# Patient Record
Sex: FEMALE | Race: OTHER | HISPANIC OR LATINO
[De-identification: names, ages, dates, MRNs, and addresses within clinical notes are randomized per-mention and may not be internally consistent; named-entity substitution may affect disease eponyms.]

---

## 2021-03-24 ENCOUNTER — RESULT REVIEW (OUTPATIENT)
Age: 46
End: 2021-03-24

## 2021-03-24 ENCOUNTER — APPOINTMENT (OUTPATIENT)
Dept: HEMATOLOGY ONCOLOGY | Facility: CLINIC | Age: 46
End: 2021-03-24
Payer: COMMERCIAL

## 2021-03-24 ENCOUNTER — LABORATORY RESULT (OUTPATIENT)
Age: 46
End: 2021-03-24

## 2021-03-24 ENCOUNTER — OUTPATIENT (OUTPATIENT)
Dept: OUTPATIENT SERVICES | Facility: HOSPITAL | Age: 46
LOS: 1 days | Discharge: HOME | End: 2021-03-24

## 2021-03-24 VITALS
DIASTOLIC BLOOD PRESSURE: 85 MMHG | HEART RATE: 84 BPM | WEIGHT: 171.5 LBS | HEIGHT: 63 IN | SYSTOLIC BLOOD PRESSURE: 136 MMHG | OXYGEN SATURATION: 100 % | BODY MASS INDEX: 30.39 KG/M2

## 2021-03-24 DIAGNOSIS — Z82.49 FAMILY HISTORY OF ISCHEMIC HEART DISEASE AND OTHER DISEASES OF THE CIRCULATORY SYSTEM: ICD-10-CM

## 2021-03-24 DIAGNOSIS — Z78.9 OTHER SPECIFIED HEALTH STATUS: ICD-10-CM

## 2021-03-24 DIAGNOSIS — C50.919 MALIGNANT NEOPLASM OF UNSPECIFIED SITE OF UNSPECIFIED FEMALE BREAST: ICD-10-CM

## 2021-03-24 LAB
BASOPHILS # BLD AUTO: 0.1 K/UL — SIGNIFICANT CHANGE UP (ref 0–0.2)
BASOPHILS NFR BLD AUTO: 0.8 % — SIGNIFICANT CHANGE UP (ref 0–2)
EOSINOPHIL # BLD AUTO: 0.1 K/UL — SIGNIFICANT CHANGE UP (ref 0–0.5)
EOSINOPHIL NFR BLD AUTO: 1.3 % — SIGNIFICANT CHANGE UP (ref 0–6)
HCT VFR BLD CALC: 44.8 % — SIGNIFICANT CHANGE UP (ref 34.5–45)
HGB BLD-MCNC: 13.8 G/DL — SIGNIFICANT CHANGE UP (ref 11.5–15.5)
LYMPHOCYTES # BLD AUTO: 1.8 K/UL — SIGNIFICANT CHANGE UP (ref 1–3.3)
LYMPHOCYTES # BLD AUTO: 21.2 % — SIGNIFICANT CHANGE UP (ref 13–44)
MCHC RBC-ENTMCNC: 25.7 PG — LOW (ref 27–34)
MCHC RBC-ENTMCNC: 30.7 G/DL — LOW (ref 32–36)
MCV RBC AUTO: 83.6 FL — SIGNIFICANT CHANGE UP (ref 80–100)
MONOCYTES # BLD AUTO: 0.5 K/UL — SIGNIFICANT CHANGE UP (ref 0–0.9)
MONOCYTES NFR BLD AUTO: 5.3 % — SIGNIFICANT CHANGE UP (ref 2–14)
NEUTROPHILS # BLD AUTO: 6.1 K/UL — SIGNIFICANT CHANGE UP (ref 1.8–7.4)
NEUTROPHILS NFR BLD AUTO: 71.3 % — SIGNIFICANT CHANGE UP (ref 43–77)
PLATELET # BLD AUTO: 329 K/UL — SIGNIFICANT CHANGE UP (ref 150–400)
RBC # BLD: 5.36 M/UL — HIGH (ref 3.8–5.2)
RBC # FLD: 13.9 % — SIGNIFICANT CHANGE UP (ref 10.3–14.5)
WBC # BLD: 8.5 K/UL — SIGNIFICANT CHANGE UP (ref 3.8–10.5)
WBC # FLD AUTO: 8.5 K/UL — SIGNIFICANT CHANGE UP (ref 3.8–10.5)

## 2021-03-24 PROCEDURE — 99205 OFFICE O/P NEW HI 60 MIN: CPT

## 2021-03-24 RX ORDER — HYDROCHLOROTHIAZIDE 12.5 MG/1
12.5 CAPSULE ORAL
Refills: 0 | Status: ACTIVE | COMMUNITY

## 2021-03-24 NOTE — ASSESSMENT
[FreeTextEntry1] : 44 yo female with  past medical history of HTN with a diagnosis of T1b N0 IDC Gr 3, with DCIS and LCIS s/p mastectomy in Feb 2021 at the age of 45\par \par Screening Mammogram done in nov 2020, with benign findings  Patient subsequently, had pain so had a biopsy on 11/25/21  PATHOLOGY with Left Breast 1.00 5cm FN Moderately differentiated IDC measuring 0.8cm, with DCIS intermediate grade with solid and cribriform pattern, focal necrosis and calcifications and extending into lobules\par \par Repeat BIopsy on 1/8/21 on 2 separate areas of left breast with DCIS intermediate to high grade cribriform , % %  In addition  Left axilla 1.2 cm Negative for malignancy, C/w a benign process\par \par Per patient genetic w/u with no Mutation with BRCA / PALB2/ PTEN\par \par Today we discussed the available radiographic and pathologic data in detail. We also discussed the natural history of the disease, as well as management options. Discussed potential of chemotherapy and endocrine therapy in management of early stage breast cancer. Endocrine therapy may include Tamoxifen or OS+AI. Discussed role of Oncotype Dx in guiding decisions about chemotherapy.\par \par - Breast surgeon Keon Coronado \par - WIll request for pathology and send Oncotype DX\par - Discussed potential of chemotherapy and or Endocrine therapy given young age\par - She is premenopausal and would consider Franklin + OFS\par - Right breast: WIll have to continue with routine imaging of right breast \par - F/u with me in 3 weeks \par

## 2021-03-24 NOTE — HISTORY OF PRESENT ILLNESS
[de-identified] : 46 yo female with  past medical history of HTN on HCTZ\par Patient was having routine screening mammograms from the age of 42\par \par On 11/3/2020: screening Mammogram with Focal asymmetry in the superior aspect of right breast Mild increase prominance of axillary nodes, impression Bengn, f/u in 1 year \par Patient continued to have pain in breast and had a biopsy \par \par On 20: BIOPSY AT [facility] \par Left Breast 1.00 5cm FN Moderately differentiated IDC measuring 0.8cm, with DCIS intermediate grade with solid and cribriform pattern, focal necrosis and calcifications and extending into lobules\par \par MRI BREAST 2020: \par LEft breast: Biopsy proven left outer breast moderately differentiated IDC and DCIS extensive surrounding enhancement throughout  the entire left breast most notable within the upper quadrant and lower outer quadrant. Enhancement is diffuse extensive and difficult to dilineate. Overall clumped contigous enhancement measures upto 14 cm from anterior to posterior  Concerning for diffuse involvement of left breast Abnormal appearance of left axillary LN concerning for metastases \par - Prominant slightly abnormal rt axilary LNs  for which Rt breast SONO is recommended \par \par On 21 patient had BIOPSY at  radiology \par -- Left Breast medial Biopsy pathology c/w DCIS intermediate to high grade cribriform , solid and focal comedo patterns with extensive lobular involvement  Largest focus 1.6 cm  % % \par -- Left breast upper posterior biopsy Pathology c/w DCIS cribriform/ SOlid DCIS grade intermediate to high  No necrosis Largest focus 0.9cm Er 100% % \par -- LEft axilla 1.2 cm Negative for malignancy, C/w a benign process\par \par On 2021 s/p Left mastectomy with with sentinal LN evaluation 3 SLN, \par - T1B IDC Gr 3, 1cm in greatest dimension, 1 cm from margin, \par - DCIS Int tohigh nuclear grade No necrosis \par 1cm From closest negative margin \par - LCIS\par LN: 4 benign LN, 1 LN benign with giant cell reaction \par \par GYN HX: Menarche 13yo, Menstruating, Regular periods,  M1 \par 13yo 10yo, 5 yo  [de-identified] : [place] : [id number] \par \par PAtient here for initial visit with her \par Recovering well post surgery Had mastectomy with deep flap \par  \par

## 2021-03-25 LAB
ALBUMIN SERPL ELPH-MCNC: 4.6 G/DL
ALP BLD-CCNC: 90 U/L
ALT SERPL-CCNC: 14 U/L
ANION GAP SERPL CALC-SCNC: 12 MMOL/L
AST SERPL-CCNC: 12 U/L
BILIRUB SERPL-MCNC: 0.2 MG/DL
BUN SERPL-MCNC: 8 MG/DL
CALCIUM SERPL-MCNC: 9.5 MG/DL
CHLORIDE SERPL-SCNC: 102 MMOL/L
CO2 SERPL-SCNC: 26 MMOL/L
CREAT SERPL-MCNC: 0.54 MG/DL
GLUCOSE SERPL-MCNC: 90 MG/DL
POTASSIUM SERPL-SCNC: 4 MMOL/L
PROT SERPL-MCNC: 7.7 G/DL
SODIUM SERPL-SCNC: 140 MMOL/L

## 2021-03-26 LAB — 25(OH)D3 SERPL-MCNC: 32.5 NG/ML

## 2021-04-05 ENCOUNTER — RESULT REVIEW (OUTPATIENT)
Age: 46
End: 2021-04-05

## 2021-04-07 LAB — SURGICAL PATHOLOGY STUDY: SIGNIFICANT CHANGE UP

## 2021-04-21 ENCOUNTER — RESULT REVIEW (OUTPATIENT)
Age: 46
End: 2021-04-21

## 2021-04-23 ENCOUNTER — APPOINTMENT (OUTPATIENT)
Dept: HEMATOLOGY ONCOLOGY | Facility: CLINIC | Age: 46
End: 2021-04-23

## 2021-04-26 LAB — SURGICAL PATHOLOGY STUDY: SIGNIFICANT CHANGE UP

## 2021-05-10 ENCOUNTER — OUTPATIENT (OUTPATIENT)
Dept: OUTPATIENT SERVICES | Facility: HOSPITAL | Age: 46
LOS: 1 days | Discharge: HOME | End: 2021-05-10

## 2021-05-10 DIAGNOSIS — C50.919 MALIGNANT NEOPLASM OF UNSPECIFIED SITE OF UNSPECIFIED FEMALE BREAST: ICD-10-CM

## 2021-05-11 ENCOUNTER — APPOINTMENT (OUTPATIENT)
Dept: HEMATOLOGY ONCOLOGY | Facility: CLINIC | Age: 46
End: 2021-05-11
Payer: COMMERCIAL

## 2021-05-11 VITALS
HEIGHT: 63 IN | BODY MASS INDEX: 30.34 KG/M2 | SYSTOLIC BLOOD PRESSURE: 148 MMHG | OXYGEN SATURATION: 100 % | HEART RATE: 85 BPM | DIASTOLIC BLOOD PRESSURE: 82 MMHG | WEIGHT: 171.25 LBS

## 2021-05-11 PROCEDURE — 99215 OFFICE O/P EST HI 40 MIN: CPT

## 2021-05-11 NOTE — HISTORY OF PRESENT ILLNESS
[de-identified] : 44 yo female with  past medical history of HTN on HCTZ\par Patient was having routine screening mammograms from the age of 42\par \par On 11/3/2020: screening Mammogram with Focal asymmetry in the superior aspect of right breast Mild increase prominance of axillary nodes, impression Bengn, f/u in 1 year \par Patient continued to have pain in breast and had a biopsy \par \par On 20: BIOPSY AT [facility] \par Left Breast 1.00 5cm FN Moderately differentiated IDC measuring 0.8cm, with DCIS intermediate grade with solid and cribriform pattern, focal necrosis and calcifications and extending into lobules\par \par MRI BREAST 2020: \par LEft breast: Biopsy proven left outer breast moderately differentiated IDC and DCIS extensive surrounding enhancement throughout  the entire left breast most notable within the upper quadrant and lower outer quadrant. Enhancement is diffuse extensive and difficult to dilineate. Overall clumped contigous enhancement measures upto 14 cm from anterior to posterior  Concerning for diffuse involvement of left breast Abnormal appearance of left axillary LN concerning for metastases \par - Prominant slightly abnormal rt axilary LNs  for which Rt breast SONO is recommended \par \par On 21 patient had BIOPSY at  radiology \par -- Left Breast medial Biopsy pathology c/w DCIS intermediate to high grade cribriform , solid and focal comedo patterns with extensive lobular involvement  Largest focus 1.6 cm  % % \par -- Left breast upper posterior biopsy Pathology c/w DCIS cribriform/ SOlid DCIS grade intermediate to high  No necrosis Largest focus 0.9cm Er 100% % \par -- LEft axilla 1.2 cm Negative for malignancy, C/w a benign process\par \par On 2021 s/p Left mastectomy with with sentinal LN evaluation 3 SLN, \par - T1B IDC Gr 3, 1cm in greatest dimension, 1 cm from margin, \par - DCIS Int tohigh nuclear grade No necrosis \par 1cm From closest negative margin \par - LCIS\par LN: 4 benign LN, 1 LN benign with giant cell reaction \par \par GYN HX: Menarche 13yo, Menstruating, Regular periods,  M1 \par 13yo 10yo, 7 yo  [de-identified] : [place] : [id number]\par \par PAtient here for initial visit with her \par Recovering well post surgery Had mastectomy with deep flap \par  Oncotype sent however Insufficient carcinoma present \par \par WIll contact company and send another block \par \par Discussed Tamoxifen/ with Lupron \par

## 2021-05-11 NOTE — ASSESSMENT
[FreeTextEntry1] : 44 yo female with  past medical history of HTN with a diagnosis of T1b N0 IDC Gr 3, with DCIS and LCIS s/p mastectomy in Feb 2021 at the age of 45\par \par Screening Mammogram done in nov 2020, with benign findings  Patient subsequently, had pain so had a biopsy on 11/25/21  PATHOLOGY with Left Breast 1.00 5cm FN Moderately differentiated IDC measuring 0.8cm, with DCIS intermediate grade with solid and cribriform pattern, focal necrosis and calcifications and extending into lobules\par \par Repeat BIopsy on 1/8/21 on 2 separate areas of left breast with DCIS intermediate to high grade cribriform , % %  In addition  Left axilla 1.2 cm Negative for malignancy, C/w a benign process\par \par On 2/5/2021 s/p Left mastectomy with with sentinal LN evaluation  T1B IDC Gr 3, 1cm in greatest dimension, 1 cm from margin, DCIS Int to high nuclear grade No necrosis  1cm From closest negative margin, LCIS\par LN: 4 benign LN, 1 LN benign with giant cell reaction \par \par She understands the potential of Chemotherapy and endocrine therapy in management of early stage breast cancer. Endocrine therapy may include Tamoxifen or OS+AI. Discussed role of Oncotype Dx in guiding decisions about chemotherapy.\par \par - Breast surgeon Keon Coronado \par -  Oncotype DX send however incufficient carcinoma present Will resend on another block, however given  surgery was in Feb 2021, will start patient with some treatment\par - She is premenopausal and would consider [place] + OFS\par - Tamoxifen 20 mg daily, discussed AE Of VTE/  Uterine carcinoma/ Hot flashes/ Lab abnormalites\par - She knows to call if s/s of VTE\par - She has a GYN that she will see \par - Right breast: WIll have to continue with routine imaging of right breast \par - refer to genetic counselor \par \par Prescription for Franklin sent to pharmacy \par F/u in 2 months\par

## 2021-05-26 ENCOUNTER — APPOINTMENT (OUTPATIENT)
Age: 46
End: 2021-05-26

## 2021-05-26 ENCOUNTER — APPOINTMENT (OUTPATIENT)
Dept: HEMATOLOGY ONCOLOGY | Facility: CLINIC | Age: 46
End: 2021-05-26

## 2021-05-26 ENCOUNTER — APPOINTMENT (OUTPATIENT)
Dept: HEMATOLOGY ONCOLOGY | Facility: CLINIC | Age: 46
End: 2021-05-26
Payer: COMMERCIAL

## 2021-05-26 VITALS
BODY MASS INDEX: 30.53 KG/M2 | DIASTOLIC BLOOD PRESSURE: 84 MMHG | OXYGEN SATURATION: 98 % | HEIGHT: 63 IN | HEART RATE: 80 BPM | SYSTOLIC BLOOD PRESSURE: 132 MMHG | WEIGHT: 172.31 LBS

## 2021-05-26 PROCEDURE — 99215 OFFICE O/P EST HI 40 MIN: CPT

## 2021-05-26 NOTE — HISTORY OF PRESENT ILLNESS
[de-identified] : 44 yo female with  past medical history of HTN on HCTZ\par Patient was having routine screening mammograms from the age of 42\par \par On 11/3/2020: screening Mammogram with Focal asymmetry in the superior aspect of right breast Mild increase prominance of axillary nodes, impression Bengn, f/u in 1 year \par Patient continued to have pain in breast and had a biopsy \par \par On 20: BIOPSY AT [facility] \par Left Breast 1.00 5cm FN Moderately differentiated IDC measuring 0.8cm, with DCIS intermediate grade with solid and cribriform pattern, focal necrosis and calcifications and extending into lobules\par \par MRI BREAST 2020: \par LEft breast: Biopsy proven left outer breast moderately differentiated IDC and DCIS extensive surrounding enhancement throughout  the entire left breast most notable within the upper quadrant and lower outer quadrant. Enhancement is diffuse extensive and difficult to dilineate. Overall clumped contigous enhancement measures upto 14 cm from anterior to posterior  Concerning for diffuse involvement of left breast Abnormal appearance of left axillary LN concerning for metastases \par - Prominant slightly abnormal rt axilary LNs  for which Rt breast SONO is recommended \par \par On 21 patient had BIOPSY at  radiology \par -- Left Breast medial Biopsy pathology c/w DCIS intermediate to high grade cribriform , solid and focal comedo patterns with extensive lobular involvement  Largest focus 1.6 cm  % % \par -- Left breast upper posterior biopsy Pathology c/w DCIS cribriform/ SOlid DCIS grade intermediate to high  No necrosis Largest focus 0.9cm Er 100% % \par -- LEft axilla 1.2 cm Negative for malignancy, C/w a benign process\par \par On 2021 s/p Left mastectomy with with sentinal LN evaluation 3 SLN, \par - T1B IDC Gr 3, 1cm in greatest dimension, 1 cm from margin, \par - DCIS Int tohigh nuclear grade No necrosis \par 1cm From closest negative margin \par - LCIS\par LN: 4 benign LN, 1 LN benign with giant cell reaction \par \par GYN HX: Menarche 15yo, Menstruating, Regular periods,  M1 \par 15yo 10yo, 5 yo  [de-identified] : [place] : [id number]\par \par PAtient here for follow up\par She started Tamoxifen on 5/12/21 Feels well. nO hot flashes, no joint pains, no nausea vomiting \par No Calf tenderness/ no SOB \par \par ONCOTYPE: 13, Distant risk of recurrence at 9y , with paniagua at 4% \par Avg chemotherapy benefit at <1% \par

## 2021-05-26 NOTE — ASSESSMENT
[FreeTextEntry1] : 46 yo female with  past medical history of HTN with a diagnosis of T1b N0 IDC Gr 3, with DCIS and LCIS s/p mastectomy in Feb 2021 at the age of 45\par \par Screening Mammogram done in nov 2020, with benign findings  Patient subsequently, had pain so had a biopsy on 11/25/21  PATHOLOGY with Left Breast 1.00 5cm FN Moderately differentiated IDC measuring 0.8cm, with DCIS intermediate grade with solid and cribriform pattern, focal necrosis and calcifications and extending into lobules\par \par Repeat BIopsy on 1/8/21 on 2 separate areas of left breast with DCIS intermediate to high grade cribriform , % %  In addition  Left axilla 1.2 cm Negative for malignancy, C/w a benign process\par \par On 2/5/2021 s/p Left mastectomy with with sentinal LN evaluation  T1B IDC Gr 3, 1cm in greatest dimension, 1 cm from margin, DCIS Int to high nuclear grade No necrosis  1cm From closest negative margin, LCIS\par LN: 4 benign LN, 1 LN benign with giant cell reaction \par \par - Breast surgeon Keon Coronado \par -  Oncotype DX send however incufficient carcinoma present \par REPEAT ONCOTYPE: 13, Distant risk of recurrence at 9y , with paniagua at 4% Avg chemotherapy benefit at <1% \par given that she is 45 discussed chemotherapy with TC with her as well. PAtient really wants to forgo chemotherapy . \par - Discussed with DR Coronado, and plan for surveillance with Mammo/ MR Breast q 6 months\par \par - She is premenopausal :  [place] + OFS\par - Tamoxifen 20 mg daily, discussed AE Of VTE/  Uterine carcinoma/ Hot flashes/ Lab abnormalities\par - She knows to call if s/s of VTE\par - She has a GYN that she will see \par - Right breast: WIll have to continue with routine imaging of right breast \par -to see genetic counselor \par \par F/u in 2 months\par

## 2021-06-03 ENCOUNTER — OUTPATIENT (OUTPATIENT)
Dept: OUTPATIENT SERVICES | Facility: HOSPITAL | Age: 46
LOS: 1 days | Discharge: HOME | End: 2021-06-03

## 2021-06-03 DIAGNOSIS — C50.919 MALIGNANT NEOPLASM OF UNSPECIFIED SITE OF UNSPECIFIED FEMALE BREAST: ICD-10-CM

## 2021-06-07 ENCOUNTER — APPOINTMENT (OUTPATIENT)
Dept: HEMATOLOGY ONCOLOGY | Facility: CLINIC | Age: 46
End: 2021-06-07

## 2021-07-19 ENCOUNTER — OUTPATIENT (OUTPATIENT)
Dept: OUTPATIENT SERVICES | Facility: HOSPITAL | Age: 46
LOS: 1 days | Discharge: HOME | End: 2021-07-19

## 2021-07-19 DIAGNOSIS — C50.919 MALIGNANT NEOPLASM OF UNSPECIFIED SITE OF UNSPECIFIED FEMALE BREAST: ICD-10-CM

## 2021-07-21 ENCOUNTER — APPOINTMENT (OUTPATIENT)
Dept: HEMATOLOGY ONCOLOGY | Facility: CLINIC | Age: 46
End: 2021-07-21
Payer: COMMERCIAL

## 2021-07-21 ENCOUNTER — RESULT REVIEW (OUTPATIENT)
Age: 46
End: 2021-07-21

## 2021-07-21 VITALS
OXYGEN SATURATION: 100 % | DIASTOLIC BLOOD PRESSURE: 85 MMHG | WEIGHT: 170 LBS | SYSTOLIC BLOOD PRESSURE: 145 MMHG | BODY MASS INDEX: 30.12 KG/M2 | HEIGHT: 63 IN | HEART RATE: 85 BPM

## 2021-07-21 PROCEDURE — 99215 OFFICE O/P EST HI 40 MIN: CPT

## 2021-07-21 NOTE — ASSESSMENT
[FreeTextEntry1] : 46 yo female with  past medical history of HTN with a diagnosis of T1b N0 IDC Gr 3, with DCIS and LCIS s/p mastectomy in Feb 2021 at the age of 45\par \par Screening Mammogram done in nov 2020, with benign findings  Patient subsequently, had pain so had a biopsy on 11/25/21  PATHOLOGY with Left Breast 1.00 5cm FN Moderately differentiated IDC measuring 0.8cm, with DCIS intermediate grade with solid and cribriform pattern, focal necrosis and calcifications and extending into lobules\par \par Repeat BIopsy on 1/8/21 on 2 separate areas of left breast with DCIS intermediate to high grade cribriform , % %  In addition  Left axilla 1.2 cm Negative for malignancy, C/w a benign process\par \par On 2/5/2021 s/p Left mastectomy with with sentinal LN evaluation  T1B IDC Gr 3, 1cm in greatest dimension, 1 cm from margin, DCIS Int to high nuclear grade No necrosis  1cm From closest negative margin, LCIS\par LN: 4 benign LN, 1 LN benign with giant cell reaction \par \par - Breast surgeon Keon Coronado \par -  Oncotype DX send however incufficient carcinoma present \par REPEAT ONCOTYPE: 13, Distant risk of recurrence at 9y , with paniagua at 4% Avg chemotherapy benefit at <1% \par given that she is 45 discussed chemotherapy with TC with her as well. PAtient really wants to forgo chemotherapy . \par - Discussed with DR Coronado, and plan for surveillance with Mammo/ MR Breast q 6 months\par \par - She is premenopausal :  [place] + OFS\par - Tamoxifen 20 mg daily, discussed AE Of VTE/  Uterine carcinoma/ Hot flashes/ Lab abnormalities\par - She knows to call if s/s of VTE\par - She has a GYN that she will see in Oct 2021\par - Right breast: WIll have to continue with routine imaging of right breast \par -Saw genetic counselor, will obtain genetic test report from [facility]\par \par F/u in 3 months\par

## 2021-07-21 NOTE — HISTORY OF PRESENT ILLNESS
[de-identified] : 44 yo female with  past medical history of HTN on HCTZ\par Patient was having routine screening mammograms from the age of 42\par \par On 11/3/2020: screening Mammogram with Focal asymmetry in the superior aspect of right breast Mild increase prominance of axillary nodes, impression Bengn, f/u in 1 year \par Patient continued to have pain in breast and had a biopsy \par \par On 20: BIOPSY AT [facility] \par Left Breast 1.00 5cm FN Moderately differentiated IDC measuring 0.8cm, with DCIS intermediate grade with solid and cribriform pattern, focal necrosis and calcifications and extending into lobules\par \par MRI BREAST 2020: \par LEft breast: Biopsy proven left outer breast moderately differentiated IDC and DCIS extensive surrounding enhancement throughout  the entire left breast most notable within the upper quadrant and lower outer quadrant. Enhancement is diffuse extensive and difficult to dilineate. Overall clumped contigous enhancement measures upto 14 cm from anterior to posterior  Concerning for diffuse involvement of left breast Abnormal appearance of left axillary LN concerning for metastases \par - Prominant slightly abnormal rt axilary LNs  for which Rt breast SONO is recommended \par \par On 21 patient had BIOPSY at  radiology \par -- Left Breast medial Biopsy pathology c/w DCIS intermediate to high grade cribriform , solid and focal comedo patterns with extensive lobular involvement  Largest focus 1.6 cm  % % \par -- Left breast upper posterior biopsy Pathology c/w DCIS cribriform/ SOlid DCIS grade intermediate to high  No necrosis Largest focus 0.9cm Er 100% % \par -- LEft axilla 1.2 cm Negative for malignancy, C/w a benign process\par \par On 2021 s/p Left mastectomy with with sentinal LN evaluation 3 SLN, \par - T1B IDC Gr 3, 1cm in greatest dimension, 1 cm from margin, \par - DCIS Int tohigh nuclear grade No necrosis \par 1cm From closest negative margin \par - LCIS\par LN: 4 benign LN, 1 LN benign with giant cell reaction \par \par GYN HX: Menarche 13yo, Menstruating, Regular periods,  M1 \par 13yo 10yo, 7 yo  [de-identified] : [place] : [id number]\par \par PAtient here for follow up\par She started Tamoxifen on 5/12/21 Feels well.\par No  hot flashes, no joint pains, no nausea vomiting \par No Calf tenderness/ no SOB \par On Lupron q 3 months\par \par She goes to GYN in oct 2021 for PAp smear She knows to let them, know that she is on tamoxifen and to assess endometrial lining \par \par ONCOTYPE: 13, Distant risk of recurrence at 9y , with paniagua at 4% \par Avg chemotherapy benefit at <1% \par

## 2021-07-22 ENCOUNTER — NON-APPOINTMENT (OUTPATIENT)
Age: 46
End: 2021-07-22

## 2021-07-22 LAB
ALBUMIN SERPL ELPH-MCNC: 4.5 G/DL
ALP BLD-CCNC: 68 U/L
ALT SERPL-CCNC: 16 U/L
ANION GAP SERPL CALC-SCNC: 12 MMOL/L
AST SERPL-CCNC: 17 U/L
BILIRUB SERPL-MCNC: <0.2 MG/DL
BUN SERPL-MCNC: 9 MG/DL
CALCIUM SERPL-MCNC: 9.5 MG/DL
CHLORIDE SERPL-SCNC: 105 MMOL/L
CO2 SERPL-SCNC: 25 MMOL/L
CREAT SERPL-MCNC: 0.62 MG/DL
GLUCOSE SERPL-MCNC: 115 MG/DL
POTASSIUM SERPL-SCNC: 3.4 MMOL/L
PROT SERPL-MCNC: 8.2 G/DL
SODIUM SERPL-SCNC: 142 MMOL/L

## 2021-08-18 ENCOUNTER — APPOINTMENT (OUTPATIENT)
Age: 46
End: 2021-08-18

## 2021-09-15 ENCOUNTER — APPOINTMENT (OUTPATIENT)
Age: 46
End: 2021-09-15

## 2021-11-08 ENCOUNTER — OUTPATIENT (OUTPATIENT)
Dept: OUTPATIENT SERVICES | Facility: HOSPITAL | Age: 46
LOS: 1 days | Discharge: HOME | End: 2021-11-08

## 2021-11-08 DIAGNOSIS — C50.919 MALIGNANT NEOPLASM OF UNSPECIFIED SITE OF UNSPECIFIED FEMALE BREAST: ICD-10-CM

## 2021-11-10 ENCOUNTER — APPOINTMENT (OUTPATIENT)
Dept: HEMATOLOGY ONCOLOGY | Facility: CLINIC | Age: 46
End: 2021-11-10
Payer: COMMERCIAL

## 2021-11-10 ENCOUNTER — APPOINTMENT (OUTPATIENT)
Age: 46
End: 2021-11-10

## 2021-11-10 VITALS
HEART RATE: 114 BPM | WEIGHT: 168 LBS | DIASTOLIC BLOOD PRESSURE: 92 MMHG | BODY MASS INDEX: 29.77 KG/M2 | SYSTOLIC BLOOD PRESSURE: 148 MMHG | OXYGEN SATURATION: 97 % | HEIGHT: 63 IN

## 2021-11-10 PROCEDURE — 99215 OFFICE O/P EST HI 40 MIN: CPT

## 2021-11-10 NOTE — ASSESSMENT
[FreeTextEntry1] : 47 yo female with  past medical history of HTN with a diagnosis of T1b N0 IDC Gr 3, with DCIS and LCIS s/p mastectomy in Feb 2021 at the age of 45\par \par Screening Mammogram done in nov 2020, with benign findings  Patient subsequently, had pain so had a biopsy on 11/25/21  PATHOLOGY with Left Breast 1.00 5cm FN Moderately differentiated IDC measuring 0.8cm, with DCIS intermediate grade with solid and cribriform pattern, focal necrosis and calcifications and extending into lobules\par \par Repeat BIopsy on 1/8/21 on 2 separate areas of left breast with DCIS intermediate to high grade cribriform , % %  In addition  Left axilla 1.2 cm Negative for malignancy, C/w a benign process\par \par On 2/5/2021 s/p Left mastectomy with with sentinal LN evaluation  T1B IDC Gr 3, 1cm in greatest dimension, 1 cm from margin, DCIS Int to high nuclear grade No necrosis  1cm From closest negative margin, LCIS\par LN: 4 benign LN, 1 LN benign with giant cell reaction \par \par - Breast surgeon Keon Coronado \par -   ONCOTYPE: 13, Distant risk of recurrence at 9y , with paniagua at 4% Avg chemotherapy benefit at <1% \par given that she is 45 discussed chemotherapy with TC with her as well. PAtient really wants to forgo chemotherapy . \par - Discussed with DR Coronado, and plan for surveillance with Mammo/ MR Breast q 6 months\par \par - She is premenopausal :  [place] + OFS\par - Tamoxifen 20 mg daily, discussed AE Of VTE/  Uterine carcinoma/ Hot flashes/ Lab abnormalities\par - She knows to call if s/s of VTE\par - increase lupron to q monthly \par - She saw GYN  in Oct 2021 ( Aravind in [address] [facility]) patient notified her that she is on tamoxifen\par - Right breast: WIll have to continue with routine imaging of right breast Last done a feww weeks ago Will obtain records \par - Did not see genetic counselor at [facility] Will send referral to [facility] genetics\par \par F/u in 3 months with bell \par

## 2021-11-10 NOTE — HISTORY OF PRESENT ILLNESS
[de-identified] : 46 yo female with  past medical history of HTN on HCTZ\par Patient was having routine screening mammograms from the age of 42\par \par On 11/3/2020: screening Mammogram with Focal asymmetry in the superior aspect of right breast Mild increase prominance of axillary nodes, impression Bengn, f/u in 1 year \par Patient continued to have pain in breast and had a biopsy \par \par On 20: BIOPSY AT [facility] \par Left Breast 1.00 5cm FN Moderately differentiated IDC measuring 0.8cm, with DCIS intermediate grade with solid and cribriform pattern, focal necrosis and calcifications and extending into lobules\par \par MRI BREAST 2020: \par LEft breast: Biopsy proven left outer breast moderately differentiated IDC and DCIS extensive surrounding enhancement throughout  the entire left breast most notable within the upper quadrant and lower outer quadrant. Enhancement is diffuse extensive and difficult to dilineate. Overall clumped contigous enhancement measures upto 14 cm from anterior to posterior  Concerning for diffuse involvement of left breast Abnormal appearance of left axillary LN concerning for metastases \par - Prominant slightly abnormal rt axilary LNs  for which Rt breast SONO is recommended \par \par On 21 patient had BIOPSY at  radiology \par -- Left Breast medial Biopsy pathology c/w DCIS intermediate to high grade cribriform , solid and focal comedo patterns with extensive lobular involvement  Largest focus 1.6 cm  % % \par -- Left breast upper posterior biopsy Pathology c/w DCIS cribriform/ SOlid DCIS grade intermediate to high  No necrosis Largest focus 0.9cm Er 100% % \par -- LEft axilla 1.2 cm Negative for malignancy, C/w a benign process\par \par On 2021 s/p Left mastectomy with with sentinal LN evaluation 3 SLN, \par - T1B IDC Gr 3, 1cm in greatest dimension, 1 cm from margin, \par - DCIS Int tohigh nuclear grade No necrosis \par 1cm From closest negative margin \par - LCIS\par LN: 4 benign LN, 1 LN benign with giant cell reaction \par \par GYN HX: Menarche 15yo, Menstruating, Regular periods,  M1 \par 15yo 8yo, 7 yo  [de-identified] : [place] : [id number]\par \par PAtient here for follow up\par She started Tamoxifen on 5/12/21 Feels well. denies any AE from medications \par No  hot flashes, no joint pains, no nausea vomiting \par No Calf tenderness/ no SOB \par On Lupron q 3 months, discussed increasing to q monthly \par \par saw GYN in oct 2021 for PAp smear she let them know that she is on paniagua \par Saw DR tomlin a few weeks ago  Had  scar/ sono will obtain records\par \par ONCOTYPE: 13, Distant risk of recurrence at 9y , with paniagua at 4% \par Avg chemotherapy benefit at <1% \par

## 2021-12-07 ENCOUNTER — RX RENEWAL (OUTPATIENT)
Age: 46
End: 2021-12-07

## 2021-12-08 ENCOUNTER — APPOINTMENT (OUTPATIENT)
Age: 46
End: 2021-12-08

## 2022-01-03 ENCOUNTER — OUTPATIENT (OUTPATIENT)
Dept: OUTPATIENT SERVICES | Facility: HOSPITAL | Age: 47
LOS: 1 days | Discharge: HOME | End: 2022-01-03

## 2022-01-03 DIAGNOSIS — C50.919 MALIGNANT NEOPLASM OF UNSPECIFIED SITE OF UNSPECIFIED FEMALE BREAST: ICD-10-CM

## 2022-01-05 ENCOUNTER — APPOINTMENT (OUTPATIENT)
Age: 47
End: 2022-01-05

## 2022-02-02 ENCOUNTER — APPOINTMENT (OUTPATIENT)
Age: 47
End: 2022-02-02

## 2022-02-02 ENCOUNTER — RESULT REVIEW (OUTPATIENT)
Age: 47
End: 2022-02-02

## 2022-02-02 ENCOUNTER — APPOINTMENT (OUTPATIENT)
Dept: HEMATOLOGY ONCOLOGY | Facility: CLINIC | Age: 47
End: 2022-02-02
Payer: COMMERCIAL

## 2022-02-02 VITALS
BODY MASS INDEX: 29.77 KG/M2 | WEIGHT: 168.03 LBS | SYSTOLIC BLOOD PRESSURE: 150 MMHG | HEIGHT: 63 IN | DIASTOLIC BLOOD PRESSURE: 88 MMHG | OXYGEN SATURATION: 100 % | HEART RATE: 92 BPM

## 2022-02-02 PROCEDURE — 99214 OFFICE O/P EST MOD 30 MIN: CPT

## 2022-02-03 LAB
25(OH)D3 SERPL-MCNC: 41 NG/ML
ALBUMIN SERPL ELPH-MCNC: 4.4 G/DL
ALP BLD-CCNC: 72 U/L
ALT SERPL-CCNC: 37 U/L
ANION GAP SERPL CALC-SCNC: 15 MMOL/L
AST SERPL-CCNC: 25 U/L
BILIRUB SERPL-MCNC: 0.2 MG/DL
BUN SERPL-MCNC: 10 MG/DL
CALCIUM SERPL-MCNC: 9.8 MG/DL
CHLORIDE SERPL-SCNC: 101 MMOL/L
CO2 SERPL-SCNC: 25 MMOL/L
CREAT SERPL-MCNC: 0.6 MG/DL
GLUCOSE SERPL-MCNC: 94 MG/DL
POTASSIUM SERPL-SCNC: 3.5 MMOL/L
PROT SERPL-MCNC: 7.7 G/DL
SODIUM SERPL-SCNC: 141 MMOL/L

## 2022-02-03 NOTE — HISTORY OF PRESENT ILLNESS
[de-identified] : 44 yo female with  past medical history of HTN on HCTZ\par Patient was having routine screening mammograms from the age of 42\par \par On 11/3/2020: screening Mammogram with Focal asymmetry in the superior aspect of right breast Mild increase prominance of axillary nodes, impression Bengn, f/u in 1 year \par Patient continued to have pain in breast and had a biopsy \par \par On 20: BIOPSY AT [facility] PATHOLOPGY \par Left Breast 1.00 5cm FN Moderately differentiated IDC measuring 0.8cm, with DCIS intermediate grade with solid and cribriform pattern, focal necrosis and calcifications and extending into lobules\par \par MRI BREAST 2020: \par LEft breast: Biopsy proven left outer breast moderately differentiated IDC and DCIS extensive surrounding enhancement throughout  the entire left breast most notable within the upper quadrant and lower outer quadrant. Enhancement is diffuse extensive and difficult to dilineate. Overall clumped contigous enhancement measures upto 14 cm from anterior to posterior  Concerning for diffuse involvement of left breast Abnormal appearance of left axillary LN concerning for metastases \par - Prominant slightly abnormal rt axilary LNs  for which Rt breast SONO is recommended \par \par On 21 patient had BIOPSY at  radiology \par -- Left Breast medial Biopsy pathology c/w DCIS intermediate to high grade cribriform , solid and focal comedo patterns with extensive lobular involvement  Largest focus 1.6 cm  % % \par -- Left breast upper posterior biopsy Pathology c/w DCIS cribriform/ SOlid DCIS grade intermediate to high  No necrosis Largest focus 0.9cm Er 100% % \par -- LEft axilla 1.2 cm Negative for malignancy, C/w a benign process\par \par On 2021 s/p Left mastectomy with with sentinal LN evaluation 3 SLN, \par - T1B IDC Gr 3, 1cm in greatest dimension, 1 cm from margin, \par - DCIS Int tohigh nuclear grade No necrosis \par 1cm From closest negative margin \par - LCIS\par LN: 4 benign LN, 1 LN benign with giant cell reaction \par \par GYN HX: Menarche 13yo, Menstruating, Regular periods,  M1 \par 13yo 10yo, 5 yo  [de-identified] : [place] : Lovely\par \par Patient here for follow up\par She started Tamoxifen on 5/12/21 Feels well. denies any AE from medications \par Denies hot flashes, no joint pains, nausea, vomiting, calf tenderness/ no SOB \par On Lupron q  monthly\par \par Recently saw Gyn,  states they did pelvic US and following uterine polyp, no plans for removal at this time. Will see them later this month\par Follows Dr Coronado  had  scar/ sono will obtain records\par \par ONCOTYPE: 13, Distant risk of recurrence at 9y , with paniagua at 4% \par Avg chemotherapy benefit at <1% \par

## 2022-02-03 NOTE — ASSESSMENT
[FreeTextEntry1] : 47 yo female with  past medical history of HTN with a diagnosis of T1b N0 IDC Gr 3, with DCIS and LCIS s/p mastectomy in Feb 2021 at the age of 45\par \par Screening Mammogram done in nov 2020, with benign findings  Patient subsequently, had pain so had a biopsy on 11/25/21  PATHOLOGY with Left Breast 1.00 5cm FN Moderately differentiated IDC measuring 0.8cm, with DCIS intermediate grade with solid and cribriform pattern, focal necrosis and calcifications and extending into lobules\par \par Repeat BIopsy on 1/8/21 on 2 separate areas of left breast with DCIS intermediate to high grade cribriform , % %  In addition  Left axilla 1.2 cm Negative for malignancy, C/w a benign process\par \par On 2/5/2021 s/p Left mastectomy with with sentinal LN evaluation  T1B IDC Gr 3, 1cm in greatest dimension, 1 cm from margin, DCIS Int to high nuclear grade No necrosis  1cm From closest negative margin, LCIS\par LN: 4 benign LN, 1 LN benign with giant cell reaction \par \par - Breast surgeon Keon Coronado \par -   ONCOTYPE: 13, Distant risk of recurrence at 9y , with paniagua at 4% Avg chemotherapy benefit at <1% \par given that she is 45 discussed chemotherapy with TC with her as well. PAtient really wants to forgo chemotherapy . \par - Discussed with DR Coronado, and plan for surveillance with Mammo/ MR Breast q 6 months\par \par - She is premenopausal :  [place] + OFS\par - Tamoxifen 20 mg daily, discussed AE Of VTE/  Uterine carcinoma/ Hot flashes/ Lab abnormalities\par - She knows to call if s/s of VTE\par -  Continue lupron q monthly \par - She saw GYN/CM ( Iris Haywood CM) patient notified her that she is on tamoxifen. Will obtain pelvic US recently done\par - Right breast: WIll have to continue with routine imaging of right breast. Will obtain records \par - Did not see genetic counselor at [facility] Will send referral to [facility] genetics\par \par F/u in 3 months \par

## 2022-02-28 ENCOUNTER — OUTPATIENT (OUTPATIENT)
Dept: OUTPATIENT SERVICES | Facility: HOSPITAL | Age: 47
LOS: 1 days | Discharge: HOME | End: 2022-02-28

## 2022-02-28 DIAGNOSIS — C50.919 MALIGNANT NEOPLASM OF UNSPECIFIED SITE OF UNSPECIFIED FEMALE BREAST: ICD-10-CM

## 2022-03-02 ENCOUNTER — APPOINTMENT (OUTPATIENT)
Age: 47
End: 2022-03-02

## 2022-03-30 ENCOUNTER — APPOINTMENT (OUTPATIENT)
Age: 47
End: 2022-03-30

## 2022-04-20 ENCOUNTER — OUTPATIENT (OUTPATIENT)
Dept: OUTPATIENT SERVICES | Facility: HOSPITAL | Age: 47
LOS: 1 days | Discharge: HOME | End: 2022-04-20

## 2022-04-20 DIAGNOSIS — C50.919 MALIGNANT NEOPLASM OF UNSPECIFIED SITE OF UNSPECIFIED FEMALE BREAST: ICD-10-CM

## 2022-04-27 ENCOUNTER — RESULT REVIEW (OUTPATIENT)
Age: 47
End: 2022-04-27

## 2022-04-27 ENCOUNTER — APPOINTMENT (OUTPATIENT)
Dept: HEMATOLOGY ONCOLOGY | Facility: CLINIC | Age: 47
End: 2022-04-27
Payer: MEDICAID

## 2022-04-27 ENCOUNTER — APPOINTMENT (OUTPATIENT)
Age: 47
End: 2022-04-27

## 2022-04-27 VITALS
OXYGEN SATURATION: 98 % | HEIGHT: 63 IN | HEART RATE: 90 BPM | DIASTOLIC BLOOD PRESSURE: 80 MMHG | SYSTOLIC BLOOD PRESSURE: 135 MMHG | BODY MASS INDEX: 31.01 KG/M2 | WEIGHT: 175 LBS

## 2022-04-27 LAB
BASOPHILS # BLD AUTO: 0.1 K/UL — SIGNIFICANT CHANGE UP (ref 0–0.2)
BASOPHILS NFR BLD AUTO: 0.7 % — SIGNIFICANT CHANGE UP (ref 0–2)
EOSINOPHIL # BLD AUTO: 0.2 K/UL — SIGNIFICANT CHANGE UP (ref 0–0.5)
EOSINOPHIL NFR BLD AUTO: 2.6 % — SIGNIFICANT CHANGE UP (ref 0–6)
HCT VFR BLD CALC: 35.5 % — SIGNIFICANT CHANGE UP (ref 34.5–45)
HGB BLD-MCNC: 12 G/DL — SIGNIFICANT CHANGE UP (ref 11.5–15.5)
LYMPHOCYTES # BLD AUTO: 2.8 K/UL — SIGNIFICANT CHANGE UP (ref 1–3.3)
LYMPHOCYTES # BLD AUTO: 31.1 % — SIGNIFICANT CHANGE UP (ref 13–44)
MCHC RBC-ENTMCNC: 30.7 PG — SIGNIFICANT CHANGE UP (ref 27–34)
MCHC RBC-ENTMCNC: 33.7 G/DL — SIGNIFICANT CHANGE UP (ref 32–36)
MCV RBC AUTO: 90.9 FL — SIGNIFICANT CHANGE UP (ref 80–100)
MONOCYTES # BLD AUTO: 0.7 K/UL — SIGNIFICANT CHANGE UP (ref 0–0.9)
MONOCYTES NFR BLD AUTO: 7.3 % — SIGNIFICANT CHANGE UP (ref 2–14)
NEUTROPHILS # BLD AUTO: 5.3 K/UL — SIGNIFICANT CHANGE UP (ref 1.8–7.4)
NEUTROPHILS NFR BLD AUTO: 58.4 % — SIGNIFICANT CHANGE UP (ref 43–77)
PLATELET # BLD AUTO: 302 K/UL — SIGNIFICANT CHANGE UP (ref 150–400)
RBC # BLD: 3.9 M/UL — SIGNIFICANT CHANGE UP (ref 3.8–5.2)
RBC # FLD: 12 % — SIGNIFICANT CHANGE UP (ref 10.3–14.5)
WBC # BLD: 9.1 K/UL — SIGNIFICANT CHANGE UP (ref 3.8–10.5)
WBC # FLD AUTO: 9.1 K/UL — SIGNIFICANT CHANGE UP (ref 3.8–10.5)

## 2022-04-27 PROCEDURE — 99215 OFFICE O/P EST HI 40 MIN: CPT

## 2022-04-27 NOTE — HISTORY OF PRESENT ILLNESS
[de-identified] : 44 yo female with  past medical history of HTN on HCTZ\par Patient was having routine screening mammograms from the age of 42\par \par On 11/3/2020: screening Mammogram with Focal asymmetry in the superior aspect of right breast Mild increase prominance of axillary nodes, impression Bengn, f/u in 1 year \par Patient continued to have pain in breast and had a biopsy \par \par On 20: BIOPSY AT [facility] PATHOLOPGY \par Left Breast 1.00 5cm FN Moderately differentiated IDC measuring 0.8cm, with DCIS intermediate grade with solid and cribriform pattern, focal necrosis and calcifications and extending into lobules\par \par MRI BREAST 2020: \par LEft breast: Biopsy proven left outer breast moderately differentiated IDC and DCIS extensive surrounding enhancement throughout  the entire left breast most notable within the upper quadrant and lower outer quadrant. Enhancement is diffuse extensive and difficult to dilineate. Overall clumped contigous enhancement measures upto 14 cm from anterior to posterior  Concerning for diffuse involvement of left breast Abnormal appearance of left axillary LN concerning for metastases \par - Prominant slightly abnormal rt axilary LNs  for which Rt breast SONO is recommended \par \par On 21 patient had BIOPSY at  radiology \par -- Left Breast medial Biopsy pathology c/w DCIS intermediate to high grade cribriform , solid and focal comedo patterns with extensive lobular involvement  Largest focus 1.6 cm  % % \par -- Left breast upper posterior biopsy Pathology c/w DCIS cribriform/ SOlid DCIS grade intermediate to high  No necrosis Largest focus 0.9cm Er 100% % \par -- LEft axilla 1.2 cm Negative for malignancy, C/w a benign process\par \par On 2021 s/p Left mastectomy with with sentinal LN evaluation 3 SLN, \par - T1B IDC Gr 3, 1cm in greatest dimension, 1 cm from margin, \par - DCIS Int tohigh nuclear grade No necrosis \par 1cm From closest negative margin \par - LCIS\par LN: 4 benign LN, 1 LN benign with giant cell reaction \par \par GYN HX: Menarche 15yo, Menstruating, Regular periods,  M1 \par 15yo 12yo, 5 yo  [de-identified] : [place] : [id number]\par \par Patient here for follow up\par She started Tamoxifen on 5/12/21 Reports compliance, does not miss any doseas\par Feels well. denies any AE from medications \par no joint pains, nausea, vomiting, calf tenderness/ no SOB \par NO Hot flashes\par On Lupron q  monthly\par Has not had a period since Jun 2021 \par \par Recently saw Gyn,  states they did pelvic US and following uterine polyp, no plans for removal at this time. Will see them later this month\par Follows Dr Coronado  had  scar/ sono will obtain records\par \par ONCOTYPE: 13, Distant risk of recurrence at 9y , with paniagua at 4% \par Avg chemotherapy benefit at <1% \par

## 2022-04-27 NOTE — ASSESSMENT
[FreeTextEntry1] : 45 yo female with  past medical history of HTN with a diagnosis of T1b N0 IDC Gr 3, with DCIS and LCIS s/p mastectomy in Feb 2021 at the age of 45\par \par Screening Mammogram done in nov 2020, with benign findings  Patient subsequently, had pain so had a biopsy on 11/25/21  PATHOLOGY with Left Breast 1.00 5cm FN Moderately differentiated IDC measuring 0.8cm, with DCIS intermediate grade with solid and cribriform pattern, focal necrosis and calcifications and extending into lobules\par \par Repeat BIopsy on 1/8/21 on 2 separate areas of left breast with DCIS intermediate to high grade cribriform , % %  In addition  Left axilla 1.2 cm Negative for malignancy, C/w a benign process\par \par On 2/5/2021 s/p Left mastectomy with with sentinal LN evaluation  T1B IDC Gr 3, 1cm in greatest dimension, 1 cm from margin, DCIS Int to high nuclear grade No necrosis  1cm From closest negative margin, LCIS\par LN: 4 benign LN, 1 LN benign with giant cell reaction \par \par - Breast surgeon Keon Coronado \par -   ONCOTYPE: 13, Distant risk of recurrence at 9y , with paniagua at 4% Avg chemotherapy benefit at <1% \par given that she is 45 discussed chemotherapy with TC with her as well. PAtient really wants to forgo chemotherapy . \par - Discussed with Dr Coronado, and plan for surveillance with Mammo/ MR Breast q 6 months: Mammogram in 11/2021: Birads 3 with post surgical scarring with foci of fat necrosis along the inferior aspect of the reconstructed breast along the scar. S/p left mastectomy with reconstruction \par \par - She is premenopausal :  [place] + OFS\par -Continue taking Vit D  \par - Tamoxifen 20 mg daily, discussed AE Of VTE/  Uterine carcinoma/ Hot flashes/ Lab abnormalities\par - She knows to call if s/s of VTE\par -  Continue lupron q monthly \par - She saw GYN/CM ( Iris Haywood CM) patient notified her that she is on tamoxifen. Will obtain pelvic US recently done\par - Right breast: WIll have to continue with routine imaging of right breast. Next in May 2022\par - Did not see genetic counselor at [facility] Sent referral to [facility] however she did not make appointment  again advised to f/u \par \par F/u in 3 months \par

## 2022-04-28 LAB
25(OH)D3 SERPL-MCNC: 42.5 NG/ML
ALBUMIN SERPL ELPH-MCNC: 4.4 G/DL
ALP BLD-CCNC: 74 U/L
ALT SERPL-CCNC: 35 U/L
ANION GAP SERPL CALC-SCNC: 15 MMOL/L
AST SERPL-CCNC: 28 U/L
BILIRUB SERPL-MCNC: <0.2 MG/DL
BUN SERPL-MCNC: 13 MG/DL
CALCIUM SERPL-MCNC: 9.7 MG/DL
CHLORIDE SERPL-SCNC: 104 MMOL/L
CO2 SERPL-SCNC: 24 MMOL/L
CREAT SERPL-MCNC: 0.62 MG/DL
EGFR: 113 ML/MIN/1.73M2
GLUCOSE SERPL-MCNC: 116 MG/DL
POTASSIUM SERPL-SCNC: 4 MMOL/L
PROT SERPL-MCNC: 7.7 G/DL
SODIUM SERPL-SCNC: 143 MMOL/L

## 2022-05-25 ENCOUNTER — OUTPATIENT (OUTPATIENT)
Dept: OUTPATIENT SERVICES | Facility: HOSPITAL | Age: 47
LOS: 1 days | Discharge: HOME | End: 2022-05-25

## 2022-05-25 DIAGNOSIS — C50.919 MALIGNANT NEOPLASM OF UNSPECIFIED SITE OF UNSPECIFIED FEMALE BREAST: ICD-10-CM

## 2022-05-27 ENCOUNTER — APPOINTMENT (OUTPATIENT)
Age: 47
End: 2022-05-27

## 2022-06-23 ENCOUNTER — OUTPATIENT (OUTPATIENT)
Dept: OUTPATIENT SERVICES | Facility: HOSPITAL | Age: 47
LOS: 1 days | Discharge: HOME | End: 2022-06-23

## 2022-06-23 DIAGNOSIS — C50.919 MALIGNANT NEOPLASM OF UNSPECIFIED SITE OF UNSPECIFIED FEMALE BREAST: ICD-10-CM

## 2022-06-24 ENCOUNTER — APPOINTMENT (OUTPATIENT)
Age: 47
End: 2022-06-24

## 2022-07-22 ENCOUNTER — RESULT REVIEW (OUTPATIENT)
Age: 47
End: 2022-07-22

## 2022-07-22 ENCOUNTER — APPOINTMENT (OUTPATIENT)
Age: 47
End: 2022-07-22

## 2022-07-22 LAB
BASOPHILS # BLD AUTO: 0.1 K/UL — SIGNIFICANT CHANGE UP (ref 0–0.2)
BASOPHILS NFR BLD AUTO: 1 % — SIGNIFICANT CHANGE UP (ref 0–2)
EOSINOPHIL # BLD AUTO: 0.2 K/UL — SIGNIFICANT CHANGE UP (ref 0–0.5)
EOSINOPHIL NFR BLD AUTO: 1.9 % — SIGNIFICANT CHANGE UP (ref 0–6)
HCT VFR BLD CALC: 33.8 % — LOW (ref 34.5–45)
HGB BLD-MCNC: 11.6 G/DL — SIGNIFICANT CHANGE UP (ref 11.5–15.5)
LYMPHOCYTES # BLD AUTO: 3 K/UL — SIGNIFICANT CHANGE UP (ref 1–3.3)
LYMPHOCYTES # BLD AUTO: 34.6 % — SIGNIFICANT CHANGE UP (ref 13–44)
MCHC RBC-ENTMCNC: 30.9 PG — SIGNIFICANT CHANGE UP (ref 27–34)
MCHC RBC-ENTMCNC: 34.3 G/DL — SIGNIFICANT CHANGE UP (ref 32–36)
MCV RBC AUTO: 90 FL — SIGNIFICANT CHANGE UP (ref 80–100)
MONOCYTES # BLD AUTO: 0.6 K/UL — SIGNIFICANT CHANGE UP (ref 0–0.9)
MONOCYTES NFR BLD AUTO: 7.3 % — SIGNIFICANT CHANGE UP (ref 2–14)
NEUTROPHILS # BLD AUTO: 4.7 K/UL — SIGNIFICANT CHANGE UP (ref 1.8–7.4)
NEUTROPHILS NFR BLD AUTO: 55.2 % — SIGNIFICANT CHANGE UP (ref 43–77)
PLATELET # BLD AUTO: 319 K/UL — SIGNIFICANT CHANGE UP (ref 150–400)
RBC # BLD: 3.76 M/UL — LOW (ref 3.8–5.2)
RBC # FLD: 12.5 % — SIGNIFICANT CHANGE UP (ref 10.3–14.5)
WBC # BLD: 8.6 K/UL — SIGNIFICANT CHANGE UP (ref 3.8–10.5)
WBC # FLD AUTO: 8.6 K/UL — SIGNIFICANT CHANGE UP (ref 3.8–10.5)

## 2022-07-25 ENCOUNTER — APPOINTMENT (OUTPATIENT)
Dept: HEMATOLOGY ONCOLOGY | Facility: CLINIC | Age: 47
End: 2022-07-25

## 2022-07-25 VITALS
HEIGHT: 63 IN | SYSTOLIC BLOOD PRESSURE: 127 MMHG | DIASTOLIC BLOOD PRESSURE: 86 MMHG | BODY MASS INDEX: 31.01 KG/M2 | TEMPERATURE: 97.6 F | WEIGHT: 175 LBS | HEART RATE: 83 BPM | OXYGEN SATURATION: 98 %

## 2022-07-25 LAB
25(OH)D3 SERPL-MCNC: 54.9 NG/ML
ALBUMIN SERPL ELPH-MCNC: 4.3 G/DL
ALP BLD-CCNC: 67 U/L
ALT SERPL-CCNC: 35 U/L
ANION GAP SERPL CALC-SCNC: 12 MMOL/L
AST SERPL-CCNC: 26 U/L
BILIRUB SERPL-MCNC: 0.2 MG/DL
BUN SERPL-MCNC: 8 MG/DL
CALCIUM SERPL-MCNC: 9 MG/DL
CHLORIDE SERPL-SCNC: 106 MMOL/L
CO2 SERPL-SCNC: 24 MMOL/L
CREAT SERPL-MCNC: 0.6 MG/DL
EGFR: 113 ML/MIN/1.73M2
GLUCOSE SERPL-MCNC: 111 MG/DL
POTASSIUM SERPL-SCNC: 4.1 MMOL/L
PROT SERPL-MCNC: 7.4 G/DL
SODIUM SERPL-SCNC: 141 MMOL/L

## 2022-07-25 PROCEDURE — 99215 OFFICE O/P EST HI 40 MIN: CPT

## 2022-07-25 NOTE — ADDENDUM
[FreeTextEntry1] : Documented by Erica Valentino acting as scribe for Dr. Ferrera on 07/25/2022 \par \par All Medical record entries made by the Scribe were at my, Dr. Ferrera's, direction and personally dictated by me on 07/25/2022 . I have reviewed the chart and agree that the record accurately reflects my personal performance of the history, physical exam, assessment and plan. I have also personally directed, reviewed, and agreed with the discharge instructions.\par

## 2022-07-25 NOTE — ASSESSMENT
[FreeTextEntry1] : 45 yo female with  past medical history of HTN with a diagnosis of T1b N0 IDC Gr 3, with DCIS and LCIS s/p mastectomy in Feb 2021 at the age of 45\par \par Screening Mammogram done in nov 2020, with benign findings  Patient subsequently, had pain so had a biopsy on 11/25/21  PATHOLOGY with Left Breast 1.00 5cm FN Moderately differentiated IDC measuring 0.8cm, with DCIS intermediate grade with solid and cribriform pattern, focal necrosis and calcifications and extending into lobules\par \par Repeat BIopsy on 1/8/21 on 2 separate areas of left breast with DCIS intermediate to high grade cribriform , % %  In addition  Left axilla 1.2 cm Negative for malignancy, C/w a benign process\par \par On 2/5/2021 s/p Left mastectomy with with sentinal LN evaluation  T1B IDC Gr 3, 1cm in greatest dimension, 1 cm from margin, DCIS Int to high nuclear grade No necrosis  1cm From closest negative margin, LCIS\par LN: 4 benign LN, 1 LN benign with giant cell reaction \par \par - Breast surgeon Keon Coronado \par -   ONCOTYPE: 13, Distant risk of recurrence at 9y , with franklin at 4% Avg chemotherapy benefit at <1% \par given that she is 45 discussed chemotherapy with TC with her as well. PAtient really wants to forgo chemotherapy . \par - Discussed with Dr Coronado, and plan for surveillance with Mammo/ MR Breast q 6 months: Mammogram in 11/2021: Birads 3 with post surgical scarring with foci of fat necrosis along the inferior aspect of the reconstructed breast along the scar. S/p left mastectomy with reconstruction \par \par Reviewed 7/22/22 labs; CBC WNL, Vitamin D WNL, CMP WNL\par \par - LMP June 2021:  Frank[place] + OFS\par -Continue taking Vit D  \par - Tamoxifen 20 mg daily, discussed AE Of VTE/  Uterine carcinoma/ Hot flashes/ Lab abnormalities\par - She knows to call if s/s of VTE\par -  Continue lupron q monthly \par - She saw GYN/CM ( Iris Haywood CM) Will obtain pelvic US in October 2022.\par - Right breast:Recent Mammogram per dR edd haskins \par -Did not see genetic counselor at [facility] Sent referral to [facility] however she did not make appointment again advised to f/u \rigo \rigo F/u in 3- 4 months with osmani de oliveira

## 2022-07-25 NOTE — HISTORY OF PRESENT ILLNESS
[de-identified] : 46 yo female with  past medical history of HTN on HCTZ\par Patient was having routine screening mammograms from the age of 42\par \par On 11/3/2020: screening Mammogram with Focal asymmetry in the superior aspect of right breast Mild increase prominance of axillary nodes, impression Bengn, f/u in 1 year \par Patient continued to have pain in breast and had a biopsy \par \par On 20: BIOPSY AT [facility] PATHOLOPGY \par Left Breast 1.00 5cm FN Moderately differentiated IDC measuring 0.8cm, with DCIS intermediate grade with solid and cribriform pattern, focal necrosis and calcifications and extending into lobules\par \par MRI BREAST 2020: \par LEft breast: Biopsy proven left outer breast moderately differentiated IDC and DCIS extensive surrounding enhancement throughout  the entire left breast most notable within the upper quadrant and lower outer quadrant. Enhancement is diffuse extensive and difficult to dilineate. Overall clumped contigous enhancement measures upto 14 cm from anterior to posterior  Concerning for diffuse involvement of left breast Abnormal appearance of left axillary LN concerning for metastases \par - Prominant slightly abnormal rt axilary LNs  for which Rt breast SONO is recommended \par \par On 21 patient had BIOPSY at  radiology \par -- Left Breast medial Biopsy pathology c/w DCIS intermediate to high grade cribriform , solid and focal comedo patterns with extensive lobular involvement  Largest focus 1.6 cm  % % \par -- Left breast upper posterior biopsy Pathology c/w DCIS cribriform/ SOlid DCIS grade intermediate to high  No necrosis Largest focus 0.9cm Er 100% % \par -- LEft axilla 1.2 cm Negative for malignancy, C/w a benign process\par \par On 2021 s/p Left mastectomy with with sentinal LN evaluation 3 SLN, \par - T1B IDC Gr 3, 1cm in greatest dimension, 1 cm from margin, \par - DCIS Int tohigh nuclear grade No necrosis \par 1cm From closest negative margin \par - LCIS\par LN: 4 benign LN, 1 LN benign with giant cell reaction \par \par GYN HX: Menarche 15yo, Menstruating, Regular periods,  M1 \par 15yo 8yo, 7 yo  [de-identified] : : [id number]\par \par Patient here for follow up \par She started Tamoxifen on 5/12/21 Reports compliance, does not miss any doses\par On Lupron q  monthly. Recieved last dose on 7/22/22\par Has f/u with Dr. Coronado on 7/18/22\par Routine mammo/sono; Normal per patient  \par Continues taking vitamin D\par Has not had a period since Jun 2021 \par \par GYN; Iris Haywood CM)\par Follows Dr Coronado  had scar/ sono will obtain records\par \par ONCOTYPE: 13, Distant risk of recurrence at 9y , with paniagua at 4% \par Avg chemotherapy benefit at <1% \par

## 2022-07-25 NOTE — CONSULT LETTER
[Dear  ___] : Dear  [unfilled], [Consult Letter:] : I had the pleasure of evaluating your patient, [unfilled]. [Please see my note below.] : Please see my note below. [Consult Closing:] : Thank you very much for allowing me to participate in the care of this patient.  If you have any questions, please do not hesitate to contact me. [Sincerely,] : Sincerely, [DrAbraham  ___] : Dr. FOY [FreeTextEntry3] : Cristiane Ferrera MD\par [facility]\par Hematologist/ Medical Oncologist\par [address] [address] \par [phone](Ph)  [phone] (fax)\par

## 2022-08-19 ENCOUNTER — APPOINTMENT (OUTPATIENT)
Age: 47
End: 2022-08-19

## 2022-09-12 ENCOUNTER — OUTPATIENT (OUTPATIENT)
Dept: OUTPATIENT SERVICES | Facility: HOSPITAL | Age: 47
LOS: 1 days | Discharge: HOME | End: 2022-09-12

## 2022-09-12 DIAGNOSIS — C50.919 MALIGNANT NEOPLASM OF UNSPECIFIED SITE OF UNSPECIFIED FEMALE BREAST: ICD-10-CM

## 2022-09-16 ENCOUNTER — APPOINTMENT (OUTPATIENT)
Age: 47
End: 2022-09-16

## 2022-10-14 ENCOUNTER — APPOINTMENT (OUTPATIENT)
Age: 47
End: 2022-10-14

## 2022-11-11 ENCOUNTER — RESULT REVIEW (OUTPATIENT)
Age: 47
End: 2022-11-11

## 2022-11-11 ENCOUNTER — APPOINTMENT (OUTPATIENT)
Age: 47
End: 2022-11-11

## 2022-11-11 ENCOUNTER — APPOINTMENT (OUTPATIENT)
Dept: HEMATOLOGY ONCOLOGY | Facility: CLINIC | Age: 47
End: 2022-11-11

## 2022-11-11 VITALS
TEMPERATURE: 97.8 F | HEART RATE: 79 BPM | DIASTOLIC BLOOD PRESSURE: 86 MMHG | WEIGHT: 174 LBS | BODY MASS INDEX: 30.83 KG/M2 | SYSTOLIC BLOOD PRESSURE: 146 MMHG | HEIGHT: 63 IN | OXYGEN SATURATION: 100 %

## 2022-11-11 LAB
BASOPHILS # BLD AUTO: 0.1 K/UL — SIGNIFICANT CHANGE UP (ref 0–0.2)
BASOPHILS NFR BLD AUTO: 1.1 % — SIGNIFICANT CHANGE UP (ref 0–2)
EOSINOPHIL # BLD AUTO: 0.2 K/UL — SIGNIFICANT CHANGE UP (ref 0–0.5)
EOSINOPHIL NFR BLD AUTO: 1.9 % — SIGNIFICANT CHANGE UP (ref 0–6)
HCT VFR BLD CALC: 35.9 % — SIGNIFICANT CHANGE UP (ref 34.5–45)
HGB BLD-MCNC: 12.1 G/DL — SIGNIFICANT CHANGE UP (ref 11.5–15.5)
LYMPHOCYTES # BLD AUTO: 3 K/UL — SIGNIFICANT CHANGE UP (ref 1–3.3)
LYMPHOCYTES # BLD AUTO: 32.4 % — SIGNIFICANT CHANGE UP (ref 13–44)
MCHC RBC-ENTMCNC: 30.7 PG — SIGNIFICANT CHANGE UP (ref 27–34)
MCHC RBC-ENTMCNC: 33.7 G/DL — SIGNIFICANT CHANGE UP (ref 32–36)
MCV RBC AUTO: 91.3 FL — SIGNIFICANT CHANGE UP (ref 80–100)
MONOCYTES # BLD AUTO: 0.7 K/UL — SIGNIFICANT CHANGE UP (ref 0–0.9)
MONOCYTES NFR BLD AUTO: 7.7 % — SIGNIFICANT CHANGE UP (ref 2–14)
NEUTROPHILS # BLD AUTO: 5.2 K/UL — SIGNIFICANT CHANGE UP (ref 1.8–7.4)
NEUTROPHILS NFR BLD AUTO: 56.9 % — SIGNIFICANT CHANGE UP (ref 43–77)
PLATELET # BLD AUTO: 319 K/UL — SIGNIFICANT CHANGE UP (ref 150–400)
RBC # BLD: 3.93 M/UL — SIGNIFICANT CHANGE UP (ref 3.8–5.2)
RBC # FLD: 11.7 % — SIGNIFICANT CHANGE UP (ref 10.3–14.5)
WBC # BLD: 9.2 K/UL — SIGNIFICANT CHANGE UP (ref 3.8–10.5)
WBC # FLD AUTO: 9.2 K/UL — SIGNIFICANT CHANGE UP (ref 3.8–10.5)

## 2022-11-11 PROCEDURE — 99214 OFFICE O/P EST MOD 30 MIN: CPT

## 2022-11-11 NOTE — ASSESSMENT
[FreeTextEntry1] : 45 yo female with  past medical history of HTN with a diagnosis of T1b N0 IDC Gr 3, with DCIS and LCIS s/p mastectomy in Feb 2021 at the age of 45\par \par Screening Mammogram done in nov 2020, with benign findings  Patient subsequently, had pain so had a biopsy on 11/25/21  PATHOLOGY with Left Breast 1.00 5cm FN Moderately differentiated IDC measuring 0.8cm, with DCIS intermediate grade with solid and cribriform pattern, focal necrosis and calcifications and extending into lobules\par \par Repeat BIopsy on 1/8/21 on 2 separate areas of left breast with DCIS intermediate to high grade cribriform , % %  In addition  Left axilla 1.2 cm Negative for malignancy, C/w a benign process\par \par On 2/5/2021 s/p Left mastectomy with with sentinal LN evaluation  T1B IDC Gr 3, 1cm in greatest dimension, 1 cm from margin, DCIS Int to high nuclear grade No necrosis  1cm From closest negative margin, LCIS\par LN: 4 benign LN, 1 LN benign with giant cell reaction \par \par - Breast surgeon Keon Coronado \par -   ONCOTYPE: 13, Distant risk of recurrence at 9y , with paniagua at 4% Avg chemotherapy benefit at <1% \par given that she is 45 discussed chemotherapy with TC with her as well. PAtient really wants to forgo chemotherapy . \par - Discussed with Dr Coronado, and plan for surveillance with Mammo/ MR Breast q 6 months: Mammogram in 11/2021: Birads 3 with post surgical scarring with foci of fat necrosis along the inferior aspect of the reconstructed breast along the scar. S/p left mastectomy with reconstruction \par \par Reviewed 7/22/22 labs; CBC WNL, Vitamin D WNL, CMP WNL\par \par - LMP June 2021:  [place] + OFS\par -Continue taking Vit D  \par - Tamoxifen 20 mg daily, discussed AE Of VTE/  Uterine carcinoma/ Hot flashes/ Lab abnormalities\par - She knows to call if s/s of VTE\par -  Continue lupron q monthly \par - She saw GYN/CM ( Iris Haywood CM) Will obtain pelvic US in October 2022.\par - Right breast:Recent Mammogram per Dr. Coronado's office, due June 2022 \par -Did not see genetic counselor at [facility] Sent referral to [facility] however she did not make appointment again advised to f/u \par \par F/u in 3- 4 months\par

## 2022-11-11 NOTE — HISTORY OF PRESENT ILLNESS
[de-identified] : 46 yo female with  past medical history of HTN on HCTZ\par Patient was having routine screening mammograms from the age of 42\par \par On 11/3/2020: screening Mammogram with Focal asymmetry in the superior aspect of right breast Mild increase prominance of axillary nodes, impression Bengn, f/u in 1 year \par Patient continued to have pain in breast and had a biopsy \par \par On 20: BIOPSY AT [facility] PATHOLOPGY \par Left Breast 1.00 5cm FN Moderately differentiated IDC measuring 0.8cm, with DCIS intermediate grade with solid and cribriform pattern, focal necrosis and calcifications and extending into lobules\par \par MRI BREAST 2020: \par LEft breast: Biopsy proven left outer breast moderately differentiated IDC and DCIS extensive surrounding enhancement throughout  the entire left breast most notable within the upper quadrant and lower outer quadrant. Enhancement is diffuse extensive and difficult to dilineate. Overall clumped contigous enhancement measures upto 14 cm from anterior to posterior  Concerning for diffuse involvement of left breast Abnormal appearance of left axillary LN concerning for metastases \par - Prominant slightly abnormal rt axilary LNs  for which Rt breast SONO is recommended \par \par On 21 patient had BIOPSY at  radiology \par -- Left Breast medial Biopsy pathology c/w DCIS intermediate to high grade cribriform , solid and focal comedo patterns with extensive lobular involvement  Largest focus 1.6 cm  % % \par -- Left breast upper posterior biopsy Pathology c/w DCIS cribriform/ SOlid DCIS grade intermediate to high  No necrosis Largest focus 0.9cm Er 100% % \par -- LEft axilla 1.2 cm Negative for malignancy, C/w a benign process\par \par On 2021 s/p Left mastectomy with with sentinal LN evaluation 3 SLN, \par - T1B IDC Gr 3, 1cm in greatest dimension, 1 cm from margin, \par - DCIS Int tohigh nuclear grade No necrosis \par 1cm From closest negative margin \par - LCIS\par LN: 4 benign LN, 1 LN benign with giant cell reaction \par \par GYN HX: Menarche 15yo, Menstruating, Regular periods,  M1 \par 15yo 8yo, 7 yo  [de-identified] : : [id number]\par \par Patient here for follow up \par She started Tamoxifen on 5/12/21 Reports compliance, does not miss any doses\par On Lupron q  monthly. Recieved last dose on 10/14/22\par Follows Dr. Coronado, last seen October 2022 \par Routine mammo/sono; Normal per patient  \par Continues taking vitamin D\par Has not had a period since Jun 2021 \par \par GYN; Iris Haywood CM)- had Pelvic US last month, patient states she had a polyp\par Follows Dr Coronado  had scar/ sono will obtain records\par \par ONCOTYPE: 13, Distant risk of recurrence at 9y , with paniagua at 4% \par Avg chemotherapy benefit at <1% \par

## 2022-11-14 LAB
25(OH)D3 SERPL-MCNC: 46.1 NG/ML
ALBUMIN SERPL ELPH-MCNC: 4.3 G/DL
ALP BLD-CCNC: 68 U/L
ALT SERPL-CCNC: 51 U/L
ANION GAP SERPL CALC-SCNC: 11 MMOL/L
AST SERPL-CCNC: 30 U/L
BILIRUB SERPL-MCNC: 0.2 MG/DL
BUN SERPL-MCNC: 7 MG/DL
CALCIUM SERPL-MCNC: 9.7 MG/DL
CHLORIDE SERPL-SCNC: 101 MMOL/L
CO2 SERPL-SCNC: 28 MMOL/L
CREAT SERPL-MCNC: 0.63 MG/DL
EGFR: 111 ML/MIN/1.73M2
GLUCOSE SERPL-MCNC: 83 MG/DL
POTASSIUM SERPL-SCNC: 3.9 MMOL/L
PROT SERPL-MCNC: 7.3 G/DL
SODIUM SERPL-SCNC: 139 MMOL/L

## 2022-12-01 ENCOUNTER — RESULT REVIEW (OUTPATIENT)
Age: 47
End: 2022-12-01

## 2022-12-05 ENCOUNTER — OUTPATIENT (OUTPATIENT)
Dept: OUTPATIENT SERVICES | Facility: HOSPITAL | Age: 47
LOS: 1 days | Discharge: HOME | End: 2022-12-05

## 2022-12-05 DIAGNOSIS — C50.919 MALIGNANT NEOPLASM OF UNSPECIFIED SITE OF UNSPECIFIED FEMALE BREAST: ICD-10-CM

## 2022-12-09 ENCOUNTER — APPOINTMENT (OUTPATIENT)
Age: 47
End: 2022-12-09

## 2023-01-06 ENCOUNTER — APPOINTMENT (OUTPATIENT)
Age: 48
End: 2023-01-06

## 2023-02-03 ENCOUNTER — RESULT REVIEW (OUTPATIENT)
Age: 48
End: 2023-02-03

## 2023-02-03 ENCOUNTER — APPOINTMENT (OUTPATIENT)
Dept: HEMATOLOGY ONCOLOGY | Facility: CLINIC | Age: 48
End: 2023-02-03
Payer: MEDICAID

## 2023-02-03 VITALS
OXYGEN SATURATION: 100 % | HEIGHT: 63 IN | SYSTOLIC BLOOD PRESSURE: 127 MMHG | WEIGHT: 173.31 LBS | DIASTOLIC BLOOD PRESSURE: 80 MMHG | BODY MASS INDEX: 30.71 KG/M2 | HEART RATE: 93 BPM

## 2023-02-03 LAB
BASOPHILS # BLD AUTO: 0.1 K/UL — SIGNIFICANT CHANGE UP (ref 0–0.2)
BASOPHILS NFR BLD AUTO: 0.8 % — SIGNIFICANT CHANGE UP (ref 0–2)
EOSINOPHIL # BLD AUTO: 0.2 K/UL — SIGNIFICANT CHANGE UP (ref 0–0.5)
EOSINOPHIL NFR BLD AUTO: 1.7 % — SIGNIFICANT CHANGE UP (ref 0–6)
HCT VFR BLD CALC: 34 % — LOW (ref 34.5–45)
HGB BLD-MCNC: 12.4 G/DL — SIGNIFICANT CHANGE UP (ref 11.5–15.5)
LYMPHOCYTES # BLD AUTO: 26.6 % — SIGNIFICANT CHANGE UP (ref 13–44)
LYMPHOCYTES # BLD AUTO: 3 K/UL — SIGNIFICANT CHANGE UP (ref 1–3.3)
MCHC RBC-ENTMCNC: 31.1 PG — SIGNIFICANT CHANGE UP (ref 27–34)
MCHC RBC-ENTMCNC: 36.4 G/DL — HIGH (ref 32–36)
MCV RBC AUTO: 85.4 FL — SIGNIFICANT CHANGE UP (ref 80–100)
MONOCYTES # BLD AUTO: 0.8 K/UL — SIGNIFICANT CHANGE UP (ref 0–0.9)
MONOCYTES NFR BLD AUTO: 7.1 % — SIGNIFICANT CHANGE UP (ref 2–14)
NEUTROPHILS # BLD AUTO: 7.1 K/UL — SIGNIFICANT CHANGE UP (ref 1.8–7.4)
NEUTROPHILS NFR BLD AUTO: 63.8 % — SIGNIFICANT CHANGE UP (ref 43–77)
PLATELET # BLD AUTO: 305 K/UL — SIGNIFICANT CHANGE UP (ref 150–400)
RBC # BLD: 3.98 M/UL — SIGNIFICANT CHANGE UP (ref 3.8–5.2)
RBC # FLD: 11.4 % — SIGNIFICANT CHANGE UP (ref 10.3–14.5)
WBC # BLD: 11.1 K/UL — HIGH (ref 3.8–10.5)
WBC # FLD AUTO: 11.1 K/UL — HIGH (ref 3.8–10.5)

## 2023-02-03 PROCEDURE — 99215 OFFICE O/P EST HI 40 MIN: CPT

## 2023-02-03 NOTE — HISTORY OF PRESENT ILLNESS
[de-identified] : 44 yo female with  past medical history of HTN on HCTZ\par Patient was having routine screening mammograms from the age of 42\par \par On 11/3/2020: screening Mammogram with Focal asymmetry in the superior aspect of right breast Mild increase prominance of axillary nodes, impression Bengn, f/u in 1 year \par Patient continued to have pain in breast and had a biopsy \par \par On 20: BIOPSY AT CBL PATHOLOPGY \par Left Breast 1.00 5cm FN Moderately differentiated IDC measuring 0.8cm, with DCIS intermediate grade with solid and cribriform pattern, focal necrosis and calcifications and extending into lobules\par \par MRI BREAST 2020: \par LEft breast: Biopsy proven left outer breast moderately differentiated IDC and DCIS extensive surrounding enhancement throughout  the entire left breast most notable within the upper quadrant and lower outer quadrant. Enhancement is diffuse extensive and difficult to dilineate. Overall clumped contigous enhancement measures upto 14 cm from anterior to posterior  Concerning for diffuse involvement of left breast Abnormal appearance of left axillary LN concerning for metastases \par - Prominant slightly abnormal rt axilary LNs  for which Rt breast SONO is recommended \par \par On 21 patient had BIOPSY at  radiology \par -- Left Breast medial Biopsy pathology c/w DCIS intermediate to high grade cribriform , solid and focal comedo patterns with extensive lobular involvement  Largest focus 1.6 cm  % % \par -- Left breast upper posterior biopsy Pathology c/w DCIS cribriform/ SOlid DCIS grade intermediate to high  No necrosis Largest focus 0.9cm Er 100% % \par -- LEft axilla 1.2 cm Negative for malignancy, C/w a benign process\par \par On 2021 s/p Left mastectomy with with sentinal LN evaluation 3 SLN, \par - T1B IDC Gr 3, 1cm in greatest dimension, 1 cm from margin, \par - DCIS Int tohigh nuclear grade No necrosis \par 1cm From closest negative margin \par - LCIS\par LN: 4 benign LN, 1 LN benign with giant cell reaction \par \par GYN HX: Menarche 13yo, Menstruating, Regular periods,  M1 \par 13yo 10yo, 7 yo  [de-identified] : : # [id number]\par \par Patient here for follow up \par She started Tamoxifen on 5/12/21 Reports compliance, does not miss any doses\par Reports she has been f/u with  Dr. 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 Recieved last dose on 1/6/23\par Continues taking vitamin D\par Has not had a period since Jun 2021 \par \par GYN; Iris Haywood CM)\par Follows Dr Coronado  had scar/ lamino will obtain records\par \par ONCOTYPE: 13, Distant risk of recurrence at 9y , with paniagua at 4% \par Avg chemotherapy benefit at <1% \par

## 2023-02-03 NOTE — ADDENDUM
[FreeTextEntry1] : Documented by Erica Valentino acting as scribe for Dr. Ferrera on 02/03/2023 \par \par All Medical record entries made by the Scribe were at my, Dr. Ferrera's, direction and personally dictated by me on 02/03/2023 . I have reviewed the chart and agree that the record accurately reflects my personal performance of the history, physical exam, assessment and plan. I have also personally directed, reviewed, and agreed with the discharge instructions.\par

## 2023-02-03 NOTE — ASSESSMENT
[FreeTextEntry1] : 45 yo female with  past medical history of HTN with a diagnosis of T1b N0 IDC Gr 3, with DCIS and LCIS s/p mastectomy in Feb 2021 at the age of 45\par \par Screening Mammogram done in nov 2020, with benign findings  Patient subsequently, had pain so had a biopsy on 11/25/21  PATHOLOGY with Left Breast 1.00 5cm FN Moderately differentiated IDC measuring 0.8cm, with DCIS intermediate grade with solid and cribriform pattern, focal necrosis and calcifications and extending into lobules\par \par Repeat BIopsy on 1/8/21 on 2 separate areas of left breast with DCIS intermediate to high grade cribriform , % %  In addition  Left axilla 1.2 cm Negative for malignancy, C/w a benign process\par \par On 2/5/2021 s/p Left mastectomy with with sentinal LN evaluation  T1B IDC Gr 3, 1cm in greatest dimension, 1 cm from margin, DCIS Int to high nuclear grade No necrosis  1cm From closest negative margin, LCIS\par LN: 4 benign LN, 1 LN benign with giant cell reaction \par \par - Breast surgeon Keon Coronado \par -   ONCOTYPE: 13, Distant risk of recurrence at 9y , with franklin at 4% Avg chemotherapy benefit at <1% \par given that she is 45 discussed chemotherapy with TC with her as well. PAtient really wants to forgo chemotherapy . \par - Discussed with Dr Coronado, and plan for surveillance with Mammo/ MR Breast q 6 months: Mammogram in 11/2021: Birads 3 with post surgical scarring with foci of fat necrosis along the inferior aspect of the reconstructed breast along the scar. S/p left mastectomy with reconstruction \par \par Reviewed labs: 11/11/22 CBC WNL, Vitamin D WNL, CMP WNL\par \par - LMP June 2021:  Franklin + OFS\par -Continue taking Vit D  \par - Tamoxifen 20 mg daily, discussed AE Of VTE/  Uterine carcinoma/ Hot flashes/ Lab abnormalities\par - She knows to call if s/s of VTE\par - Continue lupron q monthly \par - She saw GYN/CM ( Iris Haywood CM) Will obtain pelvic US in October 2022.\par - Right breast: 6/10/22: BI-RADS: No suspicious mammographic findings noted. Next mammo due 6/2023. \par -Did not see genetic counselor at [facility] Sent referral to Health system however she did not make appointment.\par -Advised to f/u with GYN q 6 months and transvaginal US yearly\par Will obtain records from Dr. Biju Salamanca (uro-gyn),\par Will obtain records from Dr. Blanca Castelan, [address] [place] [address] \par Will obtain transvaginal US from [facility]\par F/u in 4 months\par \par \par \par \par

## 2023-02-05 ENCOUNTER — OUTPATIENT (OUTPATIENT)
Dept: OUTPATIENT SERVICES | Facility: HOSPITAL | Age: 48
LOS: 1 days | Discharge: HOME | End: 2023-02-05

## 2023-02-05 DIAGNOSIS — C50.919 MALIGNANT NEOPLASM OF UNSPECIFIED SITE OF UNSPECIFIED FEMALE BREAST: ICD-10-CM

## 2023-02-06 LAB
25(OH)D3 SERPL-MCNC: 40 NG/ML
ALBUMIN SERPL ELPH-MCNC: 4.6 G/DL
ALP BLD-CCNC: 85 U/L
ALT SERPL-CCNC: 60 U/L
ANION GAP SERPL CALC-SCNC: 13 MMOL/L
AST SERPL-CCNC: 36 U/L
BILIRUB SERPL-MCNC: 0.2 MG/DL
BUN SERPL-MCNC: 8 MG/DL
CALCIUM SERPL-MCNC: 9.8 MG/DL
CHLORIDE SERPL-SCNC: 103 MMOL/L
CO2 SERPL-SCNC: 26 MMOL/L
CREAT SERPL-MCNC: 0.54 MG/DL
EGFR: 116 ML/MIN/1.73M2
GLUCOSE SERPL-MCNC: 106 MG/DL
POTASSIUM SERPL-SCNC: 3.8 MMOL/L
PROT SERPL-MCNC: 7.6 G/DL
SODIUM SERPL-SCNC: 143 MMOL/L

## 2023-02-10 ENCOUNTER — APPOINTMENT (OUTPATIENT)
Age: 48
End: 2023-02-10

## 2023-03-10 ENCOUNTER — APPOINTMENT (OUTPATIENT)
Age: 48
End: 2023-03-10

## 2023-04-06 ENCOUNTER — OUTPATIENT (OUTPATIENT)
Dept: OUTPATIENT SERVICES | Facility: HOSPITAL | Age: 48
LOS: 1 days | Discharge: HOME | End: 2023-04-06

## 2023-04-06 DIAGNOSIS — C50.919 MALIGNANT NEOPLASM OF UNSPECIFIED SITE OF UNSPECIFIED FEMALE BREAST: ICD-10-CM

## 2023-04-07 ENCOUNTER — APPOINTMENT (OUTPATIENT)
Age: 48
End: 2023-04-07

## 2023-05-05 ENCOUNTER — APPOINTMENT (OUTPATIENT)
Age: 48
End: 2023-05-05

## 2023-06-02 ENCOUNTER — APPOINTMENT (OUTPATIENT)
Age: 48
End: 2023-06-02

## 2023-06-05 ENCOUNTER — RESULT REVIEW (OUTPATIENT)
Age: 48
End: 2023-06-05

## 2023-06-05 ENCOUNTER — APPOINTMENT (OUTPATIENT)
Dept: HEMATOLOGY ONCOLOGY | Facility: CLINIC | Age: 48
End: 2023-06-05
Payer: MEDICAID

## 2023-06-05 VITALS
DIASTOLIC BLOOD PRESSURE: 85 MMHG | BODY MASS INDEX: 30.66 KG/M2 | OXYGEN SATURATION: 97 % | HEART RATE: 88 BPM | SYSTOLIC BLOOD PRESSURE: 132 MMHG | HEIGHT: 63 IN | WEIGHT: 173.04 LBS

## 2023-06-05 LAB
BASOPHILS # BLD AUTO: 0.1 K/UL — SIGNIFICANT CHANGE UP (ref 0–0.2)
BASOPHILS NFR BLD AUTO: 1.1 % — SIGNIFICANT CHANGE UP (ref 0–2)
EOSINOPHIL # BLD AUTO: 0.2 K/UL — SIGNIFICANT CHANGE UP (ref 0–0.5)
EOSINOPHIL NFR BLD AUTO: 1.8 % — SIGNIFICANT CHANGE UP (ref 0–6)
HCT VFR BLD CALC: 34 % — LOW (ref 34.5–45)
HGB BLD-MCNC: 11.8 G/DL — SIGNIFICANT CHANGE UP (ref 11.5–15.5)
LYMPHOCYTES # BLD AUTO: 2.8 K/UL — SIGNIFICANT CHANGE UP (ref 1–3.3)
LYMPHOCYTES # BLD AUTO: 32.4 % — SIGNIFICANT CHANGE UP (ref 13–44)
MCHC RBC-ENTMCNC: 30.1 PG — SIGNIFICANT CHANGE UP (ref 27–34)
MCHC RBC-ENTMCNC: 34.8 G/DL — SIGNIFICANT CHANGE UP (ref 32–36)
MCV RBC AUTO: 86.5 FL — SIGNIFICANT CHANGE UP (ref 80–100)
MONOCYTES # BLD AUTO: 0.6 K/UL — SIGNIFICANT CHANGE UP (ref 0–0.9)
MONOCYTES NFR BLD AUTO: 7.4 % — SIGNIFICANT CHANGE UP (ref 2–14)
NEUTROPHILS # BLD AUTO: 4.9 K/UL — SIGNIFICANT CHANGE UP (ref 1.8–7.4)
NEUTROPHILS NFR BLD AUTO: 57.3 % — SIGNIFICANT CHANGE UP (ref 43–77)
PLATELET # BLD AUTO: 288 K/UL — SIGNIFICANT CHANGE UP (ref 150–400)
RBC # BLD: 3.93 M/UL — SIGNIFICANT CHANGE UP (ref 3.8–5.2)
RBC # FLD: 11.7 % — SIGNIFICANT CHANGE UP (ref 10.3–14.5)
WBC # BLD: 8.6 K/UL — SIGNIFICANT CHANGE UP (ref 3.8–10.5)
WBC # FLD AUTO: 8.6 K/UL — SIGNIFICANT CHANGE UP (ref 3.8–10.5)

## 2023-06-05 PROCEDURE — 99214 OFFICE O/P EST MOD 30 MIN: CPT

## 2023-06-06 LAB
25(OH)D3 SERPL-MCNC: 38.2 NG/ML
ALBUMIN SERPL ELPH-MCNC: 4.4 G/DL
ALP BLD-CCNC: 75 U/L
ALT SERPL-CCNC: 59 U/L
ANION GAP SERPL CALC-SCNC: 14 MMOL/L
AST SERPL-CCNC: 37 U/L
BILIRUB SERPL-MCNC: 0.2 MG/DL
BUN SERPL-MCNC: 8 MG/DL
CALCIUM SERPL-MCNC: 9.5 MG/DL
CHLORIDE SERPL-SCNC: 103 MMOL/L
CO2 SERPL-SCNC: 25 MMOL/L
CREAT SERPL-MCNC: 0.64 MG/DL
EGFR: 111 ML/MIN/1.73M2
GLUCOSE SERPL-MCNC: 99 MG/DL
POTASSIUM SERPL-SCNC: 3.7 MMOL/L
PROT SERPL-MCNC: 7.6 G/DL
SODIUM SERPL-SCNC: 142 MMOL/L

## 2023-06-06 NOTE — PHYSICAL EXAM
[Normal] : bilateral breasts without nipple retraction, skin dimpling or palpable masses; the bilateral axillae are without adenopathy

## 2023-06-23 ENCOUNTER — OUTPATIENT (OUTPATIENT)
Dept: OUTPATIENT SERVICES | Facility: HOSPITAL | Age: 48
LOS: 1 days | Discharge: HOME | End: 2023-06-23

## 2023-06-23 DIAGNOSIS — C50.919 MALIGNANT NEOPLASM OF UNSPECIFIED SITE OF UNSPECIFIED FEMALE BREAST: ICD-10-CM

## 2023-06-30 ENCOUNTER — APPOINTMENT (OUTPATIENT)
Age: 48
End: 2023-06-30

## 2023-07-28 ENCOUNTER — APPOINTMENT (OUTPATIENT)
Age: 48
End: 2023-07-28

## 2023-08-08 NOTE — ASSESSMENT
[FreeTextEntry1] : 47 yo female with  past medical history of HTN with a diagnosis of T1b N0 IDC Gr 3, with DCIS and LCIS s/p mastectomy in Feb 2021 at the age of 45\par \par Screening Mammogram done in nov 2020, with benign findings  Patient subsequently, had pain so had a biopsy on 11/25/21  PATHOLOGY with Left Breast 1.00 5cm FN Moderately differentiated IDC measuring 0.8cm, with DCIS intermediate grade with solid and cribriform pattern, focal necrosis and calcifications and extending into lobules\par \par Repeat BIopsy on 1/8/21 on 2 separate areas of left breast with DCIS intermediate to high grade cribriform , % %  In addition  Left axilla 1.2 cm Negative for malignancy, C/w a benign process\par \par On 2/5/2021 s/p Left mastectomy with with sentinal LN evaluation  T1B IDC Gr 3, 1cm in greatest dimension, 1 cm from margin, DCIS Int to high nuclear grade No necrosis  1cm From closest negative margin, LCIS\par LN: 4 benign LN, 1 LN benign with giant cell reaction \par \par - Breast surgeon Keon Coronado \par -   ONCOTYPE: 13, Distant risk of recurrence at 9y , with paniagua at 4% Avg chemotherapy benefit at <1% \par given that she is 45 discussed chemotherapy with TC with her as well. PAtient really wants to forgo chemotherapy . \par - Discussed with Dr Coronado, and plan for surveillance with Mammo/ MR Breast q 6 months: Mammogram in 11/2021: Birads 3 with post surgical scarring with foci of fat necrosis along the inferior aspect of the reconstructed breast along the scar. S/p left mastectomy with reconstruction \par \par Reviewed labs: 11/11/22 CBC WNL, Vitamin D WNL, CMP WNL\par \par - LMP June 2021:  Paniagua + OFS\par -Continue taking Vit D  \par - Tamoxifen 20 mg daily, discussed AE Of VTE/  Uterine carcinoma/ Hot flashes/ Lab abnormalities\par - She knows to call if s/s of VTE\par - Continue lupron q monthly \par - Right breast: 6/10/22: BI-RADS: No suspicious mammographic findings noted. Next mammo due 6/2023. \par - Did not see genetic counselor at [facility] Sent referral to [facility] however she did not make appointment.\par -Advised to f/u with GYN q 6 months and transvaginal US yearly\par - 10/24/22 Transvaginal US from [facility]- 0.6 cm thick endometrial lining, has f/u on 6/8 with Gyn \par - Continue f/u with GYn and Dr. Coronado \par - Mammo/US scheduled at  in 1-2 weeks \par \par F/u in 4 months\par \par \par \par \par

## 2023-08-08 NOTE — HISTORY OF PRESENT ILLNESS
[de-identified] : 44 yo female with  past medical history of HTN on HCTZ\par Patient was having routine screening mammograms from the age of 42\par \par On 11/3/2020: screening Mammogram with Focal asymmetry in the superior aspect of right breast Mild increase prominance of axillary nodes, impression Bengn, f/u in 1 year \par Patient continued to have pain in breast and had a biopsy \par \par On 20: BIOPSY AT [facility] \par Left Breast 1.00 5cm FN Moderately differentiated IDC measuring 0.8cm, with DCIS intermediate grade with solid and cribriform pattern, focal necrosis and calcifications and extending into lobules\par \par MRI BREAST 2020: \par LEft breast: Biopsy proven left outer breast moderately differentiated IDC and DCIS extensive surrounding enhancement throughout  the entire left breast most notable within the upper quadrant and lower outer quadrant. Enhancement is diffuse extensive and difficult to dilineate. Overall clumped contigous enhancement measures upto 14 cm from anterior to posterior  Concerning for diffuse involvement of left breast Abnormal appearance of left axillary LN concerning for metastases \par - Prominant slightly abnormal rt axilary LNs  for which Rt breast SONO is recommended \par \par On 21 patient had BIOPSY at  radiology \par -- Left Breast medial Biopsy pathology c/w DCIS intermediate to high grade cribriform , solid and focal comedo patterns with extensive lobular involvement  Largest focus 1.6 cm  % % \par -- Left breast upper posterior biopsy Pathology c/w DCIS cribriform/ SOlid DCIS grade intermediate to high  No necrosis Largest focus 0.9cm Er 100% % \par -- LEft axilla 1.2 cm Negative for malignancy, C/w a benign process\par \par On 2021 s/p Left mastectomy with with sentinal LN evaluation 3 SLN, \par - T1B IDC Gr 3, 1cm in greatest dimension, 1 cm from margin, \par - DCIS Int tohigh nuclear grade No necrosis \par 1cm From closest negative margin \par - LCIS\par LN: 4 benign LN, 1 LN benign with giant cell reaction \par \par GYN HX: Menarche 15yo, Menstruating, Regular periods,  M1 \par 15yo 10yo, 7 yo  [de-identified] : : # [id number]\par \par Patient here for follow up \par She started Tamoxifen on 5/12/21 reports compliance, does not miss any doses\par Reports she has been f/u with  Dr. 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 Recieved last dose on 6/2/23\par Stopped  taking vitamin D\par Has not had a period since Jun 2021 \par \par OBGYN- Dr. Tee Barrett \par GYN; Iris Haywood CM)\par Breast surgery:  Dr Coronado  \par \par ONCOTYPE: 13, Distant risk of recurrence at 9y , with paniagua at 4% \par Avg chemotherapy benefit at <1% \par

## 2023-08-19 ENCOUNTER — OUTPATIENT (OUTPATIENT)
Dept: OUTPATIENT SERVICES | Facility: HOSPITAL | Age: 48
LOS: 1 days | Discharge: HOME | End: 2023-08-19

## 2023-08-19 DIAGNOSIS — C50.919 MALIGNANT NEOPLASM OF UNSPECIFIED SITE OF UNSPECIFIED FEMALE BREAST: ICD-10-CM

## 2023-08-23 ENCOUNTER — RX CHANGE (OUTPATIENT)
Age: 48
End: 2023-08-23

## 2023-08-23 RX ORDER — TAMOXIFEN CITRATE 20 MG/1
20 TABLET, FILM COATED ORAL DAILY
Qty: 30 | Refills: 6 | Status: DISCONTINUED | COMMUNITY
Start: 2021-05-11 | End: 2023-08-23

## 2023-08-25 ENCOUNTER — APPOINTMENT (OUTPATIENT)
Age: 48
End: 2023-08-25

## 2023-09-05 ENCOUNTER — APPOINTMENT (OUTPATIENT)
Dept: HEMATOLOGY ONCOLOGY | Facility: CLINIC | Age: 48
End: 2023-09-05
Payer: MEDICAID

## 2023-09-05 ENCOUNTER — RESULT REVIEW (OUTPATIENT)
Age: 48
End: 2023-09-05

## 2023-09-05 VITALS
BODY MASS INDEX: 30.51 KG/M2 | SYSTOLIC BLOOD PRESSURE: 127 MMHG | HEIGHT: 63 IN | DIASTOLIC BLOOD PRESSURE: 85 MMHG | OXYGEN SATURATION: 97 % | TEMPERATURE: 98.3 F | WEIGHT: 172.18 LBS | HEART RATE: 83 BPM

## 2023-09-05 LAB
BASOPHILS # BLD AUTO: 0.1 K/UL — SIGNIFICANT CHANGE UP (ref 0–0.2)
BASOPHILS NFR BLD AUTO: 1.1 % — SIGNIFICANT CHANGE UP (ref 0–2)
EOSINOPHIL # BLD AUTO: 0.1 K/UL — SIGNIFICANT CHANGE UP (ref 0–0.5)
EOSINOPHIL NFR BLD AUTO: 1.6 % — SIGNIFICANT CHANGE UP (ref 0–6)
HCT VFR BLD CALC: 35.9 % — SIGNIFICANT CHANGE UP (ref 34.5–45)
HGB BLD-MCNC: 12.7 G/DL — SIGNIFICANT CHANGE UP (ref 11.5–15.5)
LYMPHOCYTES # BLD AUTO: 2.8 K/UL — SIGNIFICANT CHANGE UP (ref 1–3.3)
LYMPHOCYTES # BLD AUTO: 34.1 % — SIGNIFICANT CHANGE UP (ref 13–44)
MCHC RBC-ENTMCNC: 30.9 PG — SIGNIFICANT CHANGE UP (ref 27–34)
MCHC RBC-ENTMCNC: 35.4 G/DL — SIGNIFICANT CHANGE UP (ref 32–36)
MCV RBC AUTO: 87.2 FL — SIGNIFICANT CHANGE UP (ref 80–100)
MONOCYTES # BLD AUTO: 0.6 K/UL — SIGNIFICANT CHANGE UP (ref 0–0.9)
MONOCYTES NFR BLD AUTO: 7 % — SIGNIFICANT CHANGE UP (ref 2–14)
NEUTROPHILS # BLD AUTO: 4.5 K/UL — SIGNIFICANT CHANGE UP (ref 1.8–7.4)
NEUTROPHILS NFR BLD AUTO: 56.1 % — SIGNIFICANT CHANGE UP (ref 43–77)
PLATELET # BLD AUTO: 278 K/UL — SIGNIFICANT CHANGE UP (ref 150–400)
RBC # BLD: 4.12 M/UL — SIGNIFICANT CHANGE UP (ref 3.8–5.2)
RBC # FLD: 11.6 % — SIGNIFICANT CHANGE UP (ref 10.3–14.5)
WBC # BLD: 8.1 K/UL — SIGNIFICANT CHANGE UP (ref 3.8–10.5)
WBC # FLD AUTO: 8.1 K/UL — SIGNIFICANT CHANGE UP (ref 3.8–10.5)

## 2023-09-05 PROCEDURE — 99215 OFFICE O/P EST HI 40 MIN: CPT

## 2023-09-05 NOTE — HISTORY OF PRESENT ILLNESS
[de-identified] : 46 yo female with  past medical history of HTN on HCTZ\par  Patient was having routine screening mammograms from the age of 42\par  \par  On 11/3/2020: screening Mammogram with Focal asymmetry in the superior aspect of right breast Mild increase prominance of axillary nodes, impression Bengn, f/u in 1 year \par  Patient continued to have pain in breast and had a biopsy \par  \par  On 20: BIOPSY AT [facility] \par  Left Breast 1.00 5cm FN Moderately differentiated IDC measuring 0.8cm, with DCIS intermediate grade with solid and cribriform pattern, focal necrosis and calcifications and extending into lobules\par  \par  MRI BREAST 2020: \par  LEft breast: Biopsy proven left outer breast moderately differentiated IDC and DCIS extensive surrounding enhancement throughout  the entire left breast most notable within the upper quadrant and lower outer quadrant. Enhancement is diffuse extensive and difficult to dilineate. Overall clumped contigous enhancement measures upto 14 cm from anterior to posterior  Concerning for diffuse involvement of left breast Abnormal appearance of left axillary LN concerning for metastases \par  - Prominant slightly abnormal rt axilary LNs  for which Rt breast SONO is recommended \par  \par  On 21 patient had BIOPSY at  radiology \par  -- Left Breast medial Biopsy pathology c/w DCIS intermediate to high grade cribriform , solid and focal comedo patterns with extensive lobular involvement  Largest focus 1.6 cm  % % \par  -- Left breast upper posterior biopsy Pathology c/w DCIS cribriform/ SOlid DCIS grade intermediate to high  No necrosis Largest focus 0.9cm Er 100% % \par  -- LEft axilla 1.2 cm Negative for malignancy, C/w a benign process\par  \par  On 2021 s/p Left mastectomy with with sentinal LN evaluation 3 SLN, \par  - T1B IDC Gr 3, 1cm in greatest dimension, 1 cm from margin, \par  - DCIS Int tohigh nuclear grade No necrosis \par  1cm From closest negative margin \par  - LCIS\par  LN: 4 benign LN, 1 LN benign with giant cell reaction \par  \par  GYN HX: Menarche 15yo, Menstruating, Regular periods,  M1 \par  15yo 10yo, 7 yo  [de-identified] : : # [id number]  Patient here for follow up  She started Tamoxifen on 5/12/21 reports compliance, does not miss any doses Reports she has been f/u with  Dr. 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 Denies hot flashes, joint pain, fatigue ZP in [place] and had a transvaginal US on 10/24/22- 0.6 cm thick endometrial lining Mammo/Breast Us on  6/13/23 Do not have in our system, WIll obtain   On Lupron q  monthly. Recieved last dose on 8/25/23, continue Q month Stopped  taking vitamin D Has not had a period since Jun 2021   OBGYN- Dr. Tee Barrett  GYN; Iris Haywood CM) Breast surgery:  Dr Coronado    ONCOTYPE: 13, Distant risk of recurrence at 9y , with paniagua at 4%  Avg chemotherapy benefit at <1%

## 2023-09-05 NOTE — ASSESSMENT
[FreeTextEntry1] : 45 yo female with  past medical history of HTN with a diagnosis of T1b N0 IDC Gr 3, with DCIS and LCIS s/p mastectomy in Feb 2021 at the age of 45  Screening Mammogram done in nov 2020, with benign findings  Patient subsequently, had pain so had a biopsy on 11/25/21  PATHOLOGY with Left Breast 1.00 5cm FN Moderately differentiated IDC measuring 0.8cm, with DCIS intermediate grade with solid and cribriform pattern, focal necrosis and calcifications and extending into lobules  Repeat BIopsy on 1/8/21 on 2 separate areas of left breast with DCIS intermediate to high grade cribriform , % %  In addition  Left axilla 1.2 cm Negative for malignancy, C/w a benign process  On 2/5/2021 s/p Left mastectomy with with sentinal LN evaluation  T1B IDC Gr 3, 1cm in greatest dimension, 1 cm from margin, DCIS Int to high nuclear grade No necrosis  1cm From closest negative margin, LCIS LN: 4 benign LN, 1 LN benign with giant cell reaction   - Breast surgeon Keon Coronado  -   ONCOTYPE: 13, Distant risk of recurrence at 9y , with paniagua at 4% Avg chemotherapy benefit at <1%  given that she is 45 discussed chemotherapy with TC with her as well. PAtient really wants to forgo chemotherapy .  - Discussed with Dr Coronado, and plan for surveillance with Mammo/ MR Breast q 6 months: Mammogram in 11/2021: Birads 3 with post surgical scarring with foci of fat necrosis along the inferior aspect of the reconstructed breast along the scar. S/p left mastectomy with reconstruction   Reviewed labs: 11/11/22 CBC WNL, Vitamin D WNL, CMP WNL  - LMP June 2021:  P[place] + OFS -Continue taking Vit D   - Tamoxifen 20 mg daily, discussed AE Of VTE/  Uterine carcinoma/ Hot flashes/ Lab abnormalities - She knows to call if s/s of VTE - Continue lupron q monthly  - Right breast: 6/10/22: BI-RADS: No suspicious mammographic findings noted. Next mammo due 6/2023.  - Did not see genetic counselor at [facility] Sent referral to [facility] however she did not make appointment. -Advised to f/u with GYN q 6 months and transvaginal US yearly - 10/24/22 Transvaginal [facility] from [facility]- 0.6 cm thick endometrial lining, has f/u on 6/8 with Gyn  - Continue f/u with Gyn and Dr. Coronado  - NOT had a period since Jun 2021  AT Next visit will do LH/ FSH / estradiol  Mammo/Breast Us on  6/13/23 Do not have in our system, WIll obtain Report N ( )  Pelvic USG doen at  in JUly 2023  F/u in 4 months

## 2023-09-06 LAB
ALBUMIN SERPL ELPH-MCNC: 4.6 G/DL
ALP BLD-CCNC: 78 U/L
ALT SERPL-CCNC: 56 U/L
ANION GAP SERPL CALC-SCNC: 13 MMOL/L
AST SERPL-CCNC: 34 U/L
BILIRUB SERPL-MCNC: 0.2 MG/DL
BUN SERPL-MCNC: 14 MG/DL
CALCIUM SERPL-MCNC: 9.8 MG/DL
CHLORIDE SERPL-SCNC: 104 MMOL/L
CO2 SERPL-SCNC: 24 MMOL/L
CREAT SERPL-MCNC: 0.62 MG/DL
EGFR: 112 ML/MIN/1.73M2
GLUCOSE SERPL-MCNC: 100 MG/DL
POTASSIUM SERPL-SCNC: 4.7 MMOL/L
PROT SERPL-MCNC: 8 G/DL
SODIUM SERPL-SCNC: 142 MMOL/L

## 2023-09-12 ENCOUNTER — OUTPATIENT (OUTPATIENT)
Dept: OUTPATIENT SERVICES | Facility: HOSPITAL | Age: 48
LOS: 1 days | End: 2023-09-12
Payer: COMMERCIAL

## 2023-09-12 VITALS
HEART RATE: 78 BPM | SYSTOLIC BLOOD PRESSURE: 130 MMHG | OXYGEN SATURATION: 99 % | RESPIRATION RATE: 20 BRPM | WEIGHT: 174.17 LBS | HEIGHT: 62 IN | DIASTOLIC BLOOD PRESSURE: 80 MMHG | TEMPERATURE: 98 F

## 2023-09-12 DIAGNOSIS — Z12.11 ENCOUNTER FOR SCREENING FOR MALIGNANT NEOPLASM OF COLON: ICD-10-CM

## 2023-09-12 DIAGNOSIS — Z01.818 ENCOUNTER FOR OTHER PREPROCEDURAL EXAMINATION: ICD-10-CM

## 2023-09-12 DIAGNOSIS — Z98.890 OTHER SPECIFIED POSTPROCEDURAL STATES: Chronic | ICD-10-CM

## 2023-09-12 DIAGNOSIS — Z90.12 ACQUIRED ABSENCE OF LEFT BREAST AND NIPPLE: Chronic | ICD-10-CM

## 2023-09-12 DIAGNOSIS — Z29.9 ENCOUNTER FOR PROPHYLACTIC MEASURES, UNSPECIFIED: ICD-10-CM

## 2023-09-12 LAB
A1C WITH ESTIMATED AVERAGE GLUCOSE RESULT: 5.3 % — SIGNIFICANT CHANGE UP (ref 4–5.6)
ANION GAP SERPL CALC-SCNC: 12 MMOL/L — SIGNIFICANT CHANGE UP (ref 5–17)
BASOPHILS # BLD AUTO: 0.06 K/UL — SIGNIFICANT CHANGE UP (ref 0–0.2)
BASOPHILS NFR BLD AUTO: 0.7 % — SIGNIFICANT CHANGE UP (ref 0–2)
BUN SERPL-MCNC: 12.9 MG/DL — SIGNIFICANT CHANGE UP (ref 8–20)
CALCIUM SERPL-MCNC: 9.5 MG/DL — SIGNIFICANT CHANGE UP (ref 8.4–10.5)
CHLORIDE SERPL-SCNC: 104 MMOL/L — SIGNIFICANT CHANGE UP (ref 96–108)
CO2 SERPL-SCNC: 26 MMOL/L — SIGNIFICANT CHANGE UP (ref 22–29)
CREAT SERPL-MCNC: 0.56 MG/DL — SIGNIFICANT CHANGE UP (ref 0.5–1.3)
EGFR: 115 ML/MIN/1.73M2 — SIGNIFICANT CHANGE UP
EOSINOPHIL # BLD AUTO: 0.17 K/UL — SIGNIFICANT CHANGE UP (ref 0–0.5)
EOSINOPHIL NFR BLD AUTO: 2.1 % — SIGNIFICANT CHANGE UP (ref 0–6)
ESTIMATED AVERAGE GLUCOSE: 105 MG/DL — SIGNIFICANT CHANGE UP (ref 68–114)
GLUCOSE SERPL-MCNC: 102 MG/DL — HIGH (ref 70–99)
HCG SERPL-ACNC: <4 MIU/ML — SIGNIFICANT CHANGE UP
HCT VFR BLD CALC: 35.8 % — SIGNIFICANT CHANGE UP (ref 34.5–45)
HGB BLD-MCNC: 12.2 G/DL — SIGNIFICANT CHANGE UP (ref 11.5–15.5)
IMM GRANULOCYTES NFR BLD AUTO: 0.4 % — SIGNIFICANT CHANGE UP (ref 0–0.9)
LYMPHOCYTES # BLD AUTO: 2.41 K/UL — SIGNIFICANT CHANGE UP (ref 1–3.3)
LYMPHOCYTES # BLD AUTO: 29.8 % — SIGNIFICANT CHANGE UP (ref 13–44)
MCHC RBC-ENTMCNC: 30 PG — SIGNIFICANT CHANGE UP (ref 27–34)
MCHC RBC-ENTMCNC: 34.1 GM/DL — SIGNIFICANT CHANGE UP (ref 32–36)
MCV RBC AUTO: 88 FL — SIGNIFICANT CHANGE UP (ref 80–100)
MONOCYTES # BLD AUTO: 0.63 K/UL — SIGNIFICANT CHANGE UP (ref 0–0.9)
MONOCYTES NFR BLD AUTO: 7.8 % — SIGNIFICANT CHANGE UP (ref 2–14)
NEUTROPHILS # BLD AUTO: 4.79 K/UL — SIGNIFICANT CHANGE UP (ref 1.8–7.4)
NEUTROPHILS NFR BLD AUTO: 59.2 % — SIGNIFICANT CHANGE UP (ref 43–77)
PLATELET # BLD AUTO: 302 K/UL — SIGNIFICANT CHANGE UP (ref 150–400)
POTASSIUM SERPL-MCNC: 3.9 MMOL/L — SIGNIFICANT CHANGE UP (ref 3.5–5.3)
POTASSIUM SERPL-SCNC: 3.9 MMOL/L — SIGNIFICANT CHANGE UP (ref 3.5–5.3)
RBC # BLD: 4.07 M/UL — SIGNIFICANT CHANGE UP (ref 3.8–5.2)
RBC # FLD: 12.8 % — SIGNIFICANT CHANGE UP (ref 10.3–14.5)
SODIUM SERPL-SCNC: 142 MMOL/L — SIGNIFICANT CHANGE UP (ref 135–145)
WBC # BLD: 8.09 K/UL — SIGNIFICANT CHANGE UP (ref 3.8–10.5)
WBC # FLD AUTO: 8.09 K/UL — SIGNIFICANT CHANGE UP (ref 3.8–10.5)

## 2023-09-12 PROCEDURE — G0463: CPT

## 2023-09-12 PROCEDURE — 84702 CHORIONIC GONADOTROPIN TEST: CPT

## 2023-09-12 PROCEDURE — 80048 BASIC METABOLIC PNL TOTAL CA: CPT

## 2023-09-12 PROCEDURE — 85025 COMPLETE CBC W/AUTO DIFF WBC: CPT

## 2023-09-12 PROCEDURE — 36415 COLL VENOUS BLD VENIPUNCTURE: CPT

## 2023-09-12 PROCEDURE — 83036 HEMOGLOBIN GLYCOSYLATED A1C: CPT

## 2023-09-12 RX ORDER — LEVOTHYROXINE SODIUM 125 MCG
1 TABLET ORAL
Refills: 0 | DISCHARGE

## 2023-09-12 RX ORDER — LISINOPRIL 2.5 MG/1
1 TABLET ORAL
Refills: 0 | DISCHARGE

## 2023-09-12 RX ORDER — TAMOXIFEN CITRATE 20 MG/1
1 TABLET, FILM COATED ORAL
Refills: 0 | DISCHARGE

## 2023-09-12 NOTE — H&P PST ADULT - HISTORY OF PRESENT ILLNESS
Ms. Pitts was diagnosed with left breast cancer in 11/2020 at age 45. She was treated with left mastectomy 02/05/2021 and is currently on Tamoxifen.    47 year old Tamaz[place] speaking female presents to PST today.  PMHx with HTN, left breast cancer in 11/2020 at age 45. She was treated with left mastectomy 02/05/2021 and is currently on Tamoxifen. Patient states she's having the Colonoscopy recommended by the Hematologist. Denies n/v/d, fever, chills, change in bowel habbits. She is scheduled for a colonoscopy on 9/19/23, with Dr. Coronado   47 year old Armenian speaking female presents to PST today.  PMHx HTN, left breast cancer in 11/2020 at age 45. She was treated with left mastectomy 02/05/2021 and is currently on Tamoxifen. Patient states her hematologist recommends a screening colonoscopy given her diagnosis. Denies n/v/d, fever, chills, change in bowel habits She is scheduled for a colonoscopy on 9/19/23, with Dr. Coronado

## 2023-09-12 NOTE — H&P PST ADULT - NSANTHOSAYNRD_GEN_A_CORE
No. VICTORINA screening performed.  STOP BANG Legend: 0-2 = LOW Risk; 3-4 = INTERMEDIATE Risk; 5-8 = HIGH Risk

## 2023-09-12 NOTE — H&P PST ADULT - PROBLEM SELECTOR PLAN 1
Colonoscopy    Pt instructed to stop vitamins/supplements/herbal medications/ASA/NSAIDS for one week prior to surgery and discuss with PMD.  Patient educated on, preadmission instructions, medical clearance and day of procedure medications, verbalizes understanding.  Patient states she has gotten the instructions and medication for the colonoscopy from her GI. Patient verbally expressed understanding Colonoscopy    Pt instructed to stop vitamins/supplements/herbal medications/ASA/NSAIDS for one week prior to surgery and discuss with PMD.  Patient educated on, preadmission instructions, verbally expressed understanding.  Patient states she has gotten the instructions and medication for the colonoscopy from her GI. Patient verbally expressed understanding

## 2023-09-12 NOTE — H&P PST ADULT - NSICDXPASTMEDICALHX_GEN_ALL_CORE_FT
PAST MEDICAL HISTORY:  Breast cancer, left     HTN (hypertension)      PAST MEDICAL HISTORY:  Breast cancer, left     HTN (hypertension)     Hypothyroidism

## 2023-09-12 NOTE — H&P PST ADULT - NS PRO FEM REPRO HEALTH SCREEN
Spoke with patient and discussed message below, including lab results and need for hypercoaguable workup. Please schedule patient for lab appointment for next week. Lab orders placed. mammogram

## 2023-09-12 NOTE — H&P PST ADULT - NSSUBSTANCEUSE_GEN_ALL_CORE_SD
never used/street drug/inhalant/medication abuse/caffeine denies/never used/street drug/inhalant/medication abuse/caffeine

## 2023-09-12 NOTE — H&P PST ADULT - NSICDXPASTSURGICALHX_GEN_ALL_CORE_FT
PAST SURGICAL HISTORY:  S/P mastectomy, left      PAST SURGICAL HISTORY:  H/O umbilical hernia repair     S/P mastectomy, left

## 2023-09-12 NOTE — H&P PST ADULT - ASSESSMENT
CAPRINI SCORE    AGE RELATED RISK FACTORS                                                             [ ] Age 41-60 years                                            (1 Point)  [ ] Age: 61-74 years                                           (2 Points)                 [ ] Age= 75 years                                                (3 Points)             DISEASE RELATED RISK FACTORS                                                       [ ] Edema in the lower extremities                 (1 Point)                     [ ] Varicose veins                                               (1 Point)                                 [ ] BMI > 25 Kg/m2                                            (1 Point)                                  [ ] Serious infection (ie PNA)                            (1 Point)                     [ ] Lung disease ( COPD, Emphysema)            (1 Point)                                                                          [ ] Acute myocardial infarction                         (1 Point)                  [ ] Congestive heart failure (in the previous month)  (1 Point)         [ ] Inflammatory bowel disease                            (1 Point)                  [ ] Central venous access, PICC or Port               (2 points)       (within the last month)                                                                [ ] Stroke (in the previous month)                        (5 Points)    [ ] Previous or present malignancy                       (2 points)                                                                                                                                                         HEMATOLOGY RELATED FACTORS                                                         [ ] Prior episodes of VTE                                     (3 Points)                     [ ] Positive family history for VTE                      (3 Points)                  [ ] Prothrombin 67524 A                                     (3 Points)                     [ ] Factor V Leiden                                                (3 Points)                        [ ] Lupus anticoagulants                                      (3 Points)                                                           [ ] Anticardiolipin antibodies                              (3 Points)                                                       [ ] High homocysteine in the blood                   (3 Points)                                             [ ] Other congenital or acquired thrombophilia      (3 Points)                                                [ ] Heparin induced thrombocytopenia                  (3 Points)                                        MOBILITY RELATED FACTORS  [ ] Bed rest                                                         (1 Point)  [ ] Plaster cast                                                    (2 points)  [ ] Bed bound for more than 72 hours           (2 Points)    GENDER SPECIFIC FACTORS  [ ] Pregnancy or had a baby within the last month   (1 Point)  [ ] Post-partum < 6 weeks                                   (1 Point)  [ ] Hormonal therapy  or oral contraception   (1 Point)  [ ] History of pregnancy complications              (1 point)  [ ] Unexplained or recurrent              (1 Point)    OTHER RISK FACTORS                                           (1 Point)  [ ] BMI >40, smoking, diabetes requiring insulin, chemotherapy  blood transfusions and length of surgery over 2 hours    SURGERY RELATED RISK FACTORS  [ ]  Section within the last month     (1 Point)  [ ] Minor surgery                                                  (1 Point)  [ ] Arthroscopic surgery                                       (2 Points)  [ ] Planned major surgery lasting more            (2 Points)      than 45 minutes     [ ] Elective hip or knee joint replacement       (5 points)       surgery                                                TRAUMA RELATED RISK FACTORS  [ ] Fracture of the hip, pelvis, or leg                       (5 Points)  [ ] Spinal cord injury resulting in paralysis             (5 points)       (in the previous month)    [ ] Paralysis  (less than 1 month)                             (5 Points)  [ ] Multiple Trauma within 1 month                        (5 Points)    Total Score [        ]    Caprini Score 0-2: Low Risk, NO VTE prophylaxis required for most patients, encourage ambulation  Caprini Score 3-6: Moderate Risk , pharmacologic VTE prophylaxis is indicated for most patients (in the absence of contraindications)  Caprini Score Greater than or =7: High risk, pharmocologic VTE prophylaxis indicated for most patients (in the absence of contraindications)                              OPIOID RISK TOOL    ROSINA EACH BOX THAT APPLIES AND ADD TOTALS AT THE END    FAMILY HISTORY OF SUBSTANCE ABUSE                 FEMALE         MALE                                                Alcohol                             [  ]1 pt          [  ]3pts                                               Illegal Durgs                     [  ]2 pts        [  ]3pts                                               Rx Drugs                           [  ]4 pts        [  ]4 pts    PERSONAL HISTORY OF SUBSTANCE ABUSE                                                                                          Alcohol                             [  ]3 pts       [  ]3 pts                                               Illegal Drugs                     [  ]4 pts        [  ]4 pts                                               Rx Drugs                           [  ]5 pts        [  ]5 pts    AGE BETWEEN 16-45 YEARS                                      [  ]1 pt         [  ]1 pt    HISTORY OF PREADOLESCENT   SEXUAL ABUSE                                                             [  ]3 pts        [  ]0pts    PSYCHOLOGICAL DISEASE                     ADD, OCD, Bipolar, Schizophrenia        [  ]2 pts         [  ]2 pts                      Depression                                               [  ]1 pt           [  ]1 pt           SCORING TOTAL   (add numbers and type here)              (***)                                     A score of 3 or lower indicated LOW risk for future opioid abuse  A score of 4 to 7 indicated moderate risk for future opioid abuse  A score of 8 or higher indicates a high risk for opioid abuse             CAPRINI SCORE    AGE RELATED RISK FACTORS                                                             [ ] Age 41-60 years                                            (1 Point)  [ ] Age: 61-74 years                                           (2 Points)                 [ ] Age= 75 years                                                (3 Points)             DISEASE RELATED RISK FACTORS                                                       [ ] Edema in the lower extremities                 (1 Point)                     [ ] Varicose veins                                               (1 Point)                                 [ ] BMI > 25 Kg/m2                                            (1 Point)                                  [ ] Serious infection (ie PNA)                            (1 Point)                     [ ] Lung disease ( COPD, Emphysema)            (1 Point)                                                                          [ ] Acute myocardial infarction                         (1 Point)                  [ ] Congestive heart failure (in the previous month)  (1 Point)         [ ] Inflammatory bowel disease                            (1 Point)                  [ ] Central venous access, PICC or Port               (2 points)       (within the last month)                                                                [ ] Stroke (in the previous month)                        (5 Points)    [ ] Previous or present malignancy                       (2 points)                                                                                                                                                         HEMATOLOGY RELATED FACTORS                                                         [ ] Prior episodes of VTE                                     (3 Points)                     [ ] Positive family history for VTE                      (3 Points)                  [ ] Prothrombin 88109 A                                     (3 Points)                     [ ] Factor V Leiden                                                (3 Points)                        [ ] Lupus anticoagulants                                      (3 Points)                                                           [ ] Anticardiolipin antibodies                              (3 Points)                                                       [ ] High homocysteine in the blood                   (3 Points)                                             [ ] Other congenital or acquired thrombophilia      (3 Points)                                                [ ] Heparin induced thrombocytopenia                  (3 Points)                                        MOBILITY RELATED FACTORS  [ ] Bed rest                                                         (1 Point)  [ ] Plaster cast                                                    (2 points)  [ ] Bed bound for more than 72 hours           (2 Points)    GENDER SPECIFIC FACTORS  [ ] Pregnancy or had a baby within the last month   (1 Point)  [ ] Post-partum < 6 weeks                                   (1 Point)  [ ] Hormonal therapy  or oral contraception   (1 Point)  [ ] History of pregnancy complications              (1 point)  [ ] Unexplained or recurrent              (1 Point)    OTHER RISK FACTORS                                           (1 Point)  [ ] BMI >40, smoking, diabetes requiring insulin, chemotherapy  blood transfusions and length of surgery over 2 hours    SURGERY RELATED RISK FACTORS  [ ]  Section within the last month     (1 Point)  [ ] Minor surgery                                                  (1 Point)  [ ] Arthroscopic surgery                                       (2 Points)  [ ] Planned major surgery lasting more            (2 Points)      than 45 minutes     [ ] Elective hip or knee joint replacement       (5 points)       surgery                                                TRAUMA RELATED RISK FACTORS  [ ] Fracture of the hip, pelvis, or leg                       (5 Points)  [ ] Spinal cord injury resulting in paralysis             (5 points)       (in the previous month)    [ ] Paralysis  (less than 1 month)                             (5 Points)  [ ] Multiple Trauma within 1 month                        (5 Points)    Total Score [        ]    Caprini Score 0-2: Low Risk, NO VTE prophylaxis required for most patients, encourage ambulation  Caprini Score 3-6: Moderate Risk , pharmacologic VTE prophylaxis is indicated for most patients (in the absence of contraindications)  Caprini Score Greater than or =7: High risk, pharmocologic VTE prophylaxis indicated for most patients (in the absence of contraindications)                              OPIOID RISK TOOL    ROSINA EACH BOX THAT APPLIES AND ADD TOTALS AT THE END    FAMILY HISTORY OF SUBSTANCE ABUSE                 FEMALE         MALE                                                Alcohol                             [  ]1 pt          [  ]3pts                                               Illegal Durgs                     [  ]2 pts        [  ]3pts                                               Rx Drugs                           [  ]4 pts        [  ]4 pts    PERSONAL HISTORY OF SUBSTANCE ABUSE                                                                                          Alcohol                             [  ]3 pts       [  ]3 pts                                               Illegal Drugs                     [  ]4 pts        [  ]4 pts                                               Rx Drugs                           [  ]5 pts        [  ]5 pts    AGE BETWEEN 16-45 YEARS                                      [  ]1 pt         [  ]1 pt    HISTORY OF PREADOLESCENT   SEXUAL ABUSE                                                             [  ]3 pts        [  ]0pts    PSYCHOLOGICAL DISEASE                     ADD, OCD, Bipolar, Schizophrenia        [  ]2 pts         [  ]2 pts                      Depression                                               [  ]1 pt           [  ]1 pt           SCORING TOTAL   (add numbers and type here)              (***)                                     A score of 3 or lower indicated LOW risk for future opioid abuse  A score of 4 to 7 indicated moderate risk for future opioid abuse  A score of 8 or higher indicates a high risk for opioid abuse     47 year old Albanian speaking female presents to PST today.  PMHx HTN, left breast cancer in 2020 at age 45. She was treated with left mastectomy 2021 and is currently on Tamoxifen. Patient states her hematologist recommends a screening colonoscopy given her diagnosis. Denies n/v/d, fever, chills, change in bowel habits She is scheduled for a colonoscopy on 23, with Dr. Turoff CAPRINI SCORE    AGE RELATED RISK FACTORS                                                             [x ] Age 41-60 years                                            (1 Point)  [ ] Age: 61-74 years                                           (2 Points)                 [ ] Age= 75 years                                                (3 Points)             DISEASE RELATED RISK FACTORS                                                       [ ] Edema in the lower extremities                 (1 Point)                     [ ] Varicose veins                                               (1 Point)                                 [ ] BMI > 25 Kg/m2                                            (1 Point)                                  [ ] Serious infection (ie PNA)                            (1 Point)                     [ ] Lung disease ( COPD, Emphysema)            (1 Point)                                                                          [ ] Acute myocardial infarction                         (1 Point)                  [ ] Congestive heart failure (in the previous month)  (1 Point)         [ ] Inflammatory bowel disease                            (1 Point)                  [ ] Central venous access, PICC or Port               (2 points)       (within the last month)                                                                [ ] Stroke (in the previous month)                        (5 Points)    [x ] Previous or present malignancy                       (2 points)                                                                                                                                                         HEMATOLOGY RELATED FACTORS                                                         [ ] Prior episodes of VTE                                     (3 Points)                     [ ] Positive family history for VTE                      (3 Points)                  [ ] Prothrombin 90342 A                                     (3 Points)                     [ ] Factor V Leiden                                                (3 Points)                        [ ] Lupus anticoagulants                                      (3 Points)                                                           [ ] Anticardiolipin antibodies                              (3 Points)                                                       [ ] High homocysteine in the blood                   (3 Points)                                             [ ] Other congenital or acquired thrombophilia      (3 Points)                                                [ ] Heparin induced thrombocytopenia                  (3 Points)                                        MOBILITY RELATED FACTORS  [ ] Bed rest                                                         (1 Point)  [ ] Plaster cast                                                    (2 points)  [ ] Bed bound for more than 72 hours           (2 Points)    GENDER SPECIFIC FACTORS  [ ] Pregnancy or had a baby within the last month   (1 Point)  [ ] Post-partum < 6 weeks                                   (1 Point)  [ ] Hormonal therapy  or oral contraception   (1 Point)  [ ] History of pregnancy complications              (1 point)  [ ] Unexplained or recurrent              (1 Point)    OTHER RISK FACTORS                                           (1 Point)  [ ] BMI >40, smoking, diabetes requiring insulin, chemotherapy  blood transfusions and length of surgery over 2 hours    SURGERY RELATED RISK FACTORS  [ ]  Section within the last month     (1 Point)  [ ] Minor surgery                                                  (1 Point)  [ ] Arthroscopic surgery                                       (2 Points)  [x ] Planned major surgery lasting more            (2 Points)      than 45 minutes     [ ] Elective hip or knee joint replacement       (5 points)       surgery                                                TRAUMA RELATED RISK FACTORS  [ ] Fracture of the hip, pelvis, or leg                       (5 Points)  [ ] Spinal cord injury resulting in paralysis             (5 points)       (in the previous month)    [ ] Paralysis  (less than 1 month)                             (5 Points)  [ ] Multiple Trauma within 1 month                        (5 Points)    Total Score [  5     ]    Caprini Score 0-2: Low Risk, NO VTE prophylaxis required for most patients, encourage ambulation  Caprini Score 3-6: Moderate Risk , pharmacologic VTE prophylaxis is indicated for most patients (in the absence of contraindications)  Caprini Score Greater than or =7: High risk, pharmocologic VTE prophylaxis indicated for most patients (in the absence of contraindications)    OPIOID RISK TOOL    ROSINA EACH BOX THAT APPLIES AND ADD TOTALS AT THE END    FAMILY HISTORY OF SUBSTANCE ABUSE                 FEMALE         MALE                                                Alcohol                             [  ]1 pt          [  ]3pts                                               Illegal Durgs                     [  ]2 pts        [  ]3pts                                               Rx Drugs                           [  ]4 pts        [  ]4 pts    PERSONAL HISTORY OF SUBSTANCE ABUSE                                                                                          Alcohol                             [  ]3 pts       [  ]3 pts                                               Illegal Drugs                     [  ]4 pts        [  ]4 pts                                               Rx Drugs                           [  ]5 pts        [  ]5 pts    AGE BETWEEN 16-45 YEARS                                      [  ]1 pt         [  ]1 pt    HISTORY OF PREADOLESCENT   SEXUAL ABUSE                                                             [  ]3 pts        [  ]0pts    PSYCHOLOGICAL DISEASE                     ADD, OCD, Bipolar, Schizophrenia        [  ]2 pts         [  ]2 pts                      Depression                                               [  ]1 pt           [  ]1 pt           SCORING TOTAL   (add numbers and type here)              (*0**)                                     A score of 3 or lower indicated LOW risk for future opioid abuse  A score of 4 to 7 indicated moderate risk for future opioid abuse  A score of 8 or higher indicates a high risk for opioid abuse

## 2023-09-22 ENCOUNTER — APPOINTMENT (OUTPATIENT)
Age: 48
End: 2023-09-22

## 2023-10-12 ENCOUNTER — OUTPATIENT (OUTPATIENT)
Dept: OUTPATIENT SERVICES | Facility: HOSPITAL | Age: 48
LOS: 1 days | Discharge: HOME | End: 2023-10-12

## 2023-10-12 DIAGNOSIS — C50.919 MALIGNANT NEOPLASM OF UNSPECIFIED SITE OF UNSPECIFIED FEMALE BREAST: ICD-10-CM

## 2023-10-12 DIAGNOSIS — Z98.890 OTHER SPECIFIED POSTPROCEDURAL STATES: Chronic | ICD-10-CM

## 2023-10-12 DIAGNOSIS — Z90.12 ACQUIRED ABSENCE OF LEFT BREAST AND NIPPLE: Chronic | ICD-10-CM

## 2023-10-12 PROBLEM — C50.912 MALIGNANT NEOPLASM OF UNSPECIFIED SITE OF LEFT FEMALE BREAST: Chronic | Status: ACTIVE | Noted: 2023-09-12

## 2023-10-12 PROBLEM — E03.9 HYPOTHYROIDISM, UNSPECIFIED: Chronic | Status: ACTIVE | Noted: 2023-09-12

## 2023-10-16 ENCOUNTER — TRANSCRIPTION ENCOUNTER (OUTPATIENT)
Age: 48
End: 2023-10-16

## 2023-10-17 ENCOUNTER — OUTPATIENT (OUTPATIENT)
Dept: OUTPATIENT SERVICES | Facility: HOSPITAL | Age: 48
LOS: 1 days | End: 2023-10-17
Payer: COMMERCIAL

## 2023-10-17 DIAGNOSIS — Z98.890 OTHER SPECIFIED POSTPROCEDURAL STATES: Chronic | ICD-10-CM

## 2023-10-17 DIAGNOSIS — Z90.12 ACQUIRED ABSENCE OF LEFT BREAST AND NIPPLE: Chronic | ICD-10-CM

## 2023-10-17 DIAGNOSIS — Z12.11 ENCOUNTER FOR SCREENING FOR MALIGNANT NEOPLASM OF COLON: ICD-10-CM

## 2023-10-17 PROCEDURE — G0121: CPT

## 2023-10-20 ENCOUNTER — APPOINTMENT (OUTPATIENT)
Age: 48
End: 2023-10-20

## 2023-11-16 ENCOUNTER — APPOINTMENT (OUTPATIENT)
Age: 48
End: 2023-11-16

## 2023-12-11 ENCOUNTER — OUTPATIENT (OUTPATIENT)
Dept: OUTPATIENT SERVICES | Facility: HOSPITAL | Age: 48
LOS: 1 days | Discharge: HOME | End: 2023-12-11

## 2023-12-11 DIAGNOSIS — C50.919 MALIGNANT NEOPLASM OF UNSPECIFIED SITE OF UNSPECIFIED FEMALE BREAST: ICD-10-CM

## 2023-12-11 DIAGNOSIS — Z90.12 ACQUIRED ABSENCE OF LEFT BREAST AND NIPPLE: Chronic | ICD-10-CM

## 2023-12-11 DIAGNOSIS — Z98.890 OTHER SPECIFIED POSTPROCEDURAL STATES: Chronic | ICD-10-CM

## 2023-12-15 ENCOUNTER — APPOINTMENT (OUTPATIENT)
Age: 48
End: 2023-12-15

## 2024-01-02 ENCOUNTER — RESULT REVIEW (OUTPATIENT)
Age: 49
End: 2024-01-02

## 2024-01-02 ENCOUNTER — LABORATORY RESULT (OUTPATIENT)
Age: 49
End: 2024-01-02

## 2024-01-02 ENCOUNTER — APPOINTMENT (OUTPATIENT)
Dept: HEMATOLOGY ONCOLOGY | Facility: CLINIC | Age: 49
End: 2024-01-02
Payer: MEDICAID

## 2024-01-02 VITALS
BODY MASS INDEX: 29.9 KG/M2 | HEIGHT: 63 IN | DIASTOLIC BLOOD PRESSURE: 84 MMHG | WEIGHT: 168.76 LBS | HEART RATE: 77 BPM | OXYGEN SATURATION: 97 % | TEMPERATURE: 98 F | SYSTOLIC BLOOD PRESSURE: 133 MMHG

## 2024-01-02 LAB
BASOPHILS # BLD AUTO: 0.1 K/UL — SIGNIFICANT CHANGE UP (ref 0–0.2)
BASOPHILS # BLD AUTO: 0.1 K/UL — SIGNIFICANT CHANGE UP (ref 0–0.2)
BASOPHILS NFR BLD AUTO: 0.9 % — SIGNIFICANT CHANGE UP (ref 0–2)
BASOPHILS NFR BLD AUTO: 0.9 % — SIGNIFICANT CHANGE UP (ref 0–2)
EOSINOPHIL # BLD AUTO: 0.1 K/UL — SIGNIFICANT CHANGE UP (ref 0–0.5)
EOSINOPHIL # BLD AUTO: 0.1 K/UL — SIGNIFICANT CHANGE UP (ref 0–0.5)
EOSINOPHIL NFR BLD AUTO: 1.3 % — SIGNIFICANT CHANGE UP (ref 0–6)
EOSINOPHIL NFR BLD AUTO: 1.3 % — SIGNIFICANT CHANGE UP (ref 0–6)
HCT VFR BLD CALC: 35.2 % — SIGNIFICANT CHANGE UP (ref 34.5–45)
HCT VFR BLD CALC: 35.2 % — SIGNIFICANT CHANGE UP (ref 34.5–45)
HGB BLD-MCNC: 12 G/DL — SIGNIFICANT CHANGE UP (ref 11.5–15.5)
HGB BLD-MCNC: 12 G/DL — SIGNIFICANT CHANGE UP (ref 11.5–15.5)
LYMPHOCYTES # BLD AUTO: 2.9 K/UL — SIGNIFICANT CHANGE UP (ref 1–3.3)
LYMPHOCYTES # BLD AUTO: 2.9 K/UL — SIGNIFICANT CHANGE UP (ref 1–3.3)
LYMPHOCYTES # BLD AUTO: 28.4 % — SIGNIFICANT CHANGE UP (ref 13–44)
LYMPHOCYTES # BLD AUTO: 28.4 % — SIGNIFICANT CHANGE UP (ref 13–44)
MCHC RBC-ENTMCNC: 30.2 PG — SIGNIFICANT CHANGE UP (ref 27–34)
MCHC RBC-ENTMCNC: 30.2 PG — SIGNIFICANT CHANGE UP (ref 27–34)
MCHC RBC-ENTMCNC: 34 G/DL — SIGNIFICANT CHANGE UP (ref 32–36)
MCHC RBC-ENTMCNC: 34 G/DL — SIGNIFICANT CHANGE UP (ref 32–36)
MCV RBC AUTO: 89 FL — SIGNIFICANT CHANGE UP (ref 80–100)
MCV RBC AUTO: 89 FL — SIGNIFICANT CHANGE UP (ref 80–100)
MONOCYTES # BLD AUTO: 0.8 K/UL — SIGNIFICANT CHANGE UP (ref 0–0.9)
MONOCYTES # BLD AUTO: 0.8 K/UL — SIGNIFICANT CHANGE UP (ref 0–0.9)
MONOCYTES NFR BLD AUTO: 8.1 % — SIGNIFICANT CHANGE UP (ref 2–14)
MONOCYTES NFR BLD AUTO: 8.1 % — SIGNIFICANT CHANGE UP (ref 2–14)
NEUTROPHILS # BLD AUTO: 6.2 K/UL — SIGNIFICANT CHANGE UP (ref 1.8–7.4)
NEUTROPHILS # BLD AUTO: 6.2 K/UL — SIGNIFICANT CHANGE UP (ref 1.8–7.4)
NEUTROPHILS NFR BLD AUTO: 61.2 % — SIGNIFICANT CHANGE UP (ref 43–77)
NEUTROPHILS NFR BLD AUTO: 61.2 % — SIGNIFICANT CHANGE UP (ref 43–77)
PLATELET # BLD AUTO: 311 K/UL — SIGNIFICANT CHANGE UP (ref 150–400)
PLATELET # BLD AUTO: 311 K/UL — SIGNIFICANT CHANGE UP (ref 150–400)
RBC # BLD: 3.96 M/UL — SIGNIFICANT CHANGE UP (ref 3.8–5.2)
RBC # BLD: 3.96 M/UL — SIGNIFICANT CHANGE UP (ref 3.8–5.2)
RBC # FLD: 12 % — SIGNIFICANT CHANGE UP (ref 10.3–14.5)
RBC # FLD: 12 % — SIGNIFICANT CHANGE UP (ref 10.3–14.5)
WBC # BLD: 10.2 K/UL — SIGNIFICANT CHANGE UP (ref 3.8–10.5)
WBC # BLD: 10.2 K/UL — SIGNIFICANT CHANGE UP (ref 3.8–10.5)
WBC # FLD AUTO: 10.2 K/UL — SIGNIFICANT CHANGE UP (ref 3.8–10.5)
WBC # FLD AUTO: 10.2 K/UL — SIGNIFICANT CHANGE UP (ref 3.8–10.5)

## 2024-01-02 PROCEDURE — 99214 OFFICE O/P EST MOD 30 MIN: CPT

## 2024-01-03 NOTE — ASSESSMENT
[FreeTextEntry1] : 45 yo female with past medical history of HTN with a diagnosis of T1b N0 IDC Gr 3, with DCIS and LCIS s/p mastectomy in Feb 2021 at the age of 45  Screening Mammogram done in nov 2020, with benign findings Patient subsequently, had pain so had a biopsy on 11/25/21 PATHOLOGY with Left Breast 1.00 5cm FN Moderately differentiated IDC measuring 0.8cm, with DCIS intermediate grade with solid and cribriform pattern, focal necrosis and calcifications and extending into lobules  Repeat BIopsy on 1/8/21 on 2 separate areas of left breast with DCIS intermediate to high grade cribriform , % % In addition Left axilla 1.2 cm Negative for malignancy, C/w a benign process  On 2/5/2021 s/p Left mastectomy with with sentinal LN evaluation T1B IDC Gr 3, 1cm in greatest dimension, 1 cm from margin, DCIS Int to high nuclear grade No necrosis 1cm From closest negative margin, LCIS LN: 4 benign LN, 1 LN benign with giant cell reaction  - Breast surgeon Keon Coronado - ONCOTYPE: 13, Distant risk of recurrence at 9y , with paniagua at 4% Avg chemotherapy benefit at <1% given that she is 45 discussed chemotherapy with TC with her as well. PAtient really wants to forgo chemotherapy. - Discussed with Dr Coronado, and plan for surveillance with Mammo/ MR Breast q 6 months: Mammogram in 11/2021: Birads 3 with post surgical scarring with foci of fat necrosis along the inferior aspect of the reconstructed breast along the scar. S/p left mastectomy with reconstruction  Reviewed labs: 11/11/22 CBC WNL, Vitamin D WNL, CMP WNL  - LMP June 2021: Paniagua + OFS -Continue taking Vit D - Tamoxifen 20 mg daily, discussed AE Of VTE/ Uterine carcinoma/ Hot flashes/ Lab abnormalities - She knows to call if s/s of VTE - Continue lupron q monthly - Right breast: 6/10/22: BI-RADS: No suspicious mammographic findings noted. Next mammo due 6/2023. - Did not see genetic counselor at [facility] Sent referral to [facility] however she did not make appointment. -Advised to f/u with GYN q 6 months and transvaginal US yearly - 10/24/22 Transvaginal US from [facility]- 0.6 cm thick endometrial lining, has f/u on 6/8 with Gyn - Continue f/u with Gyn and Dr. Coronado - NOT had a period since Jun 2021 - will order LH/ FSH / estradiol  -Mammo/Breast Us on 6/13/23 Bi-rads 1 -Pelvic US on 7/3/23 0.6 cm tick endometrial lining with 0.3 cm endometrial polyp  - Repeat Mammo/US in November 2023, patient felt lump. As per patient, demonstrated a fatty cyst She is following up with Dr. Coronado this month for possible removal. Will obtain copy of Mammo/US -s/p Uterine Polyps removal in November, benign s per patient. will obtain pathology   F/u in 4 months.

## 2024-01-03 NOTE — HISTORY OF PRESENT ILLNESS
[de-identified] : 44 yo female with  past medical history of HTN on HCTZ\par  Patient was having routine screening mammograms from the age of 42\par  \par  On 11/3/2020: screening Mammogram with Focal asymmetry in the superior aspect of right breast Mild increase prominance of axillary nodes, impression Bengn, f/u in 1 year \par  Patient continued to have pain in breast and had a biopsy \par  \par  On 20: BIOPSY AT [facility] PATH[facility] \par  Left Breast 1.00 5cm FN Moderately differentiated IDC measuring 0.8cm, with DCIS intermediate grade with solid and cribriform pattern, focal necrosis and calcifications and extending into lobules\par  \par  MRI BREAST 2020: \par  LEft breast: Biopsy proven left outer breast moderately differentiated IDC and DCIS extensive surrounding enhancement throughout  the entire left breast most notable within the upper quadrant and lower outer quadrant. Enhancement is diffuse extensive and difficult to dilineate. Overall clumped contigous enhancement measures upto 14 cm from anterior to posterior  Concerning for diffuse involvement of left breast Abnormal appearance of left axillary LN concerning for metastases \par  - Prominant slightly abnormal rt axilary LNs  for which Rt breast SONO is recommended \par  \par  On 21 patient had BIOPSY at  radiology \par  -- Left Breast medial Biopsy pathology c/w DCIS intermediate to high grade cribriform , solid and focal comedo patterns with extensive lobular involvement  Largest focus 1.6 cm  % % \par  -- Left breast upper posterior biopsy Pathology c/w DCIS cribriform/ SOlid DCIS grade intermediate to high  No necrosis Largest focus 0.9cm Er 100% % \par  -- LEft axilla 1.2 cm Negative for malignancy, C/w a benign process\par  \par  On 2021 s/p Left mastectomy with with sentinal LN evaluation 3 SLN, \par  - T1B IDC Gr 3, 1cm in greatest dimension, 1 cm from margin, \par  - DCIS Int tohigh nuclear grade No necrosis \par  1cm From closest negative margin \par  - LCIS\par  LN: 4 benign LN, 1 LN benign with giant cell reaction \par  \par  GYN HX: Menarche 13yo, Menstruating, Regular periods,  M1 \par  13yo 8yo, 7 yo  [de-identified] : : # [id number]  Patient here for follow up  She started Tamoxifen on 5/12/21 reports compliance, does not miss any doses On Lupron q  monthly. Received last dose on 12/15/23, continue Q month Stopped  taking vitamin D Has not had a period since Jun 2021   Patient felt lump in right breast in November 2023. Patient followed Dr. Coronado who ordered another mammo/US which demonstrated a fatty cyst She is following up with him this month for possible removal Had Uterine Polyps removed in November, benigna s per patient. will obtain pathology  Denies hot flashes, joint pain, fatigue, decreased appetite   ZP in [place] and had a transvaginal US on 10/24/22- 0.6 cm thick endometrial lining Mammo/Breast Us on  6/13/23 Bi-Rads1   [facility]  Breast surgery:  Dr Coronado    ONCOTYPE: 13, Distant risk of recurrence at 9y , with paniagua at 4%  Avg chemotherapy benefit at <1%

## 2024-01-05 LAB
ALBUMIN SERPL ELPH-MCNC: 4.2 G/DL
ALP BLD-CCNC: 77 U/L
ALT SERPL-CCNC: 54 U/L
ANION GAP SERPL CALC-SCNC: 16 MMOL/L
AST SERPL-CCNC: 34 U/L
BILIRUB SERPL-MCNC: 0.2 MG/DL
BUN SERPL-MCNC: 9 MG/DL
CALCIUM SERPL-MCNC: 9.6 MG/DL
CHLORIDE SERPL-SCNC: 102 MMOL/L
CO2 SERPL-SCNC: 23 MMOL/L
CREAT SERPL-MCNC: 0.66 MG/DL
EGFR: 109 ML/MIN/1.73M2
GLUCOSE SERPL-MCNC: 91 MG/DL
POTASSIUM SERPL-SCNC: 3.8 MMOL/L
PROT SERPL-MCNC: 7.3 G/DL
SODIUM SERPL-SCNC: 141 MMOL/L

## 2024-01-12 ENCOUNTER — APPOINTMENT (OUTPATIENT)
Age: 49
End: 2024-01-12

## 2024-02-09 ENCOUNTER — APPOINTMENT (OUTPATIENT)
Age: 49
End: 2024-02-09

## 2024-02-26 ENCOUNTER — OUTPATIENT (OUTPATIENT)
Dept: OUTPATIENT SERVICES | Facility: HOSPITAL | Age: 49
LOS: 1 days | Discharge: HOME | End: 2024-02-26

## 2024-02-26 DIAGNOSIS — C50.919 MALIGNANT NEOPLASM OF UNSPECIFIED SITE OF UNSPECIFIED FEMALE BREAST: ICD-10-CM

## 2024-02-26 DIAGNOSIS — Z98.890 OTHER SPECIFIED POSTPROCEDURAL STATES: Chronic | ICD-10-CM

## 2024-02-26 DIAGNOSIS — Z90.12 ACQUIRED ABSENCE OF LEFT BREAST AND NIPPLE: Chronic | ICD-10-CM

## 2024-03-08 ENCOUNTER — APPOINTMENT (OUTPATIENT)
Age: 49
End: 2024-03-08

## 2024-04-08 ENCOUNTER — APPOINTMENT (OUTPATIENT)
Dept: HEMATOLOGY ONCOLOGY | Facility: CLINIC | Age: 49
End: 2024-04-08
Payer: MEDICAID

## 2024-04-08 ENCOUNTER — APPOINTMENT (OUTPATIENT)
Age: 49
End: 2024-04-08

## 2024-04-08 VITALS
WEIGHT: 171.63 LBS | SYSTOLIC BLOOD PRESSURE: 145 MMHG | HEART RATE: 79 BPM | BODY MASS INDEX: 30.41 KG/M2 | DIASTOLIC BLOOD PRESSURE: 87 MMHG | HEIGHT: 63 IN | OXYGEN SATURATION: 100 %

## 2024-04-08 PROCEDURE — 99215 OFFICE O/P EST HI 40 MIN: CPT

## 2024-04-08 RX ORDER — TAMOXIFEN CITRATE 20 MG/1
20 TABLET, FILM COATED ORAL
Qty: 90 | Refills: 3 | Status: ACTIVE | COMMUNITY
Start: 2023-08-23 | End: 1900-01-01

## 2024-04-08 NOTE — ADDENDUM
[FreeTextEntry1] :  Documented by Salvador Vazquez acting as scribe for Dr. Ferrera on  04/08/2024.   All Medical record entries made by the Scribe were at my, Dr. Ferrera's, direction and personally dictated by me on  04/08/2024. I have reviewed the chart and agree that the record accurately reflects my personal performance of the history, physical exam, assessment and plan. I have also personally directed, reviewed, and agreed with the discharge instructions.

## 2024-04-08 NOTE — ASSESSMENT
[FreeTextEntry1] : 45 yo female with past medical history of HTN with a diagnosis of T1b N0 IDC Gr 3, with DCIS and LCIS s/p mastectomy in Feb 2021 at the age of 45  Screening Mammogram done in nov 2020, with benign findings Patient subsequently, had pain so had a biopsy on 11/25/21 PATHOLOGY with Left Breast 1.00 5cm FN Moderately differentiated IDC measuring 0.8cm, with DCIS intermediate grade with solid and cribriform pattern, focal necrosis and calcifications and extending into lobules  Repeat BIopsy on 1/8/21 on 2 separate areas of left breast with DCIS intermediate to high grade cribriform , % % In addition Left axilla 1.2 cm Negative for malignancy, C/w a benign process  On 2/5/2021 s/p Left mastectomy with with sentinal LN evaluation T1B IDC Gr 3, 1cm in greatest dimension, 1 cm from margin, DCIS Int to high nuclear grade No necrosis 1cm From closest negative margin, LCIS LN: 4 benign LN, 1 LN benign with giant cell reaction  - Breast surgeon Keon Coronado - ONCOTYPE: 13, Distant risk of recurrence at 9y , with paniagua at 4% Avg chemotherapy benefit at <1% given that she is 45 discussed chemotherapy with TC with her as well. PAtient really wants to forgo chemotherapy. - Discussed with Dr Coronado, and plan for surveillance with Mammo/ MR Breast q 6 months: Mammogram in 11/2021: Birads 3 with post surgical scarring with foci of fat necrosis along the inferior aspect of the reconstructed breast along the scar. S/p left mastectomy with reconstruction  11/11/22 CBC WNL, Vitamin D WNL, CMP WNL  - LMP June 2021: Paniagua + OFS -Continue taking Vit D - Tamoxifen 20 mg daily, discussed AE Of VTE/ Uterine carcinoma/ Hot flashes/ Lab abnormalities - She knows to call if s/s of VTE - Continue lupron q monthly - Right breast: 6/10/22: BI-RADS: No suspicious mammographic findings noted. Next mammo due 6/2023. - Did not see genetic counselor at [facility] Sent referral to [facility] [employer] however she did not make appointment. -Advised to f/u with GYN q 6 months and transvaginal US yearly - 10/24/22 Transvaginal US from [facility]- 0.6 cm thick endometrial lining, has f/u on 6/8 with Gyn - Continue f/u with Gyn and Dr. Coronado - NOT had a period since Jun 2021   -Mammo/Breast Us on 6/13/23 Bi-rads 1 -Pelvic US on 7/3/23 0.6 cm tick endometrial lining with 0.3 cm endometrial polyp  - Repeat Mammo/US in November 2023, patient felt lump. As per patient, demonstrated a fatty cyst She is following up with Dr. Coronado in June 2024.  -s/p Uterine Polyps removal in November, benign s per patient. will obtain pathology  - Next mammo/sono and US Transvaginal on June 2024  F/u in 4 months w/ Karen

## 2024-04-08 NOTE — HISTORY OF PRESENT ILLNESS
[de-identified] : 44 yo female with  past medical history of HTN on HCTZ\par  Patient was having routine screening mammograms from the age of 42\par  \par  On 11/3/2020: screening Mammogram with Focal asymmetry in the superior aspect of right breast Mild increase prominance of axillary nodes, impression Bengn, f/u in 1 year \par  Patient continued to have pain in breast and had a biopsy \par  \par  On 20: BIOPSY AT [facility] \par  Left Breast 1.00 5cm FN Moderately differentiated IDC measuring 0.8cm, with DCIS intermediate grade with solid and cribriform pattern, focal necrosis and calcifications and extending into lobules\par  \par  MRI BREAST 2020: \par  LEft breast: Biopsy proven left outer breast moderately differentiated IDC and DCIS extensive surrounding enhancement throughout  the entire left breast most notable within the upper quadrant and lower outer quadrant. Enhancement is diffuse extensive and difficult to dilineate. Overall clumped contigous enhancement measures upto 14 cm from anterior to posterior  Concerning for diffuse involvement of left breast Abnormal appearance of left axillary LN concerning for metastases \par  - Prominant slightly abnormal rt axilary LNs  for which Rt breast SONO is recommended \par  \par  On 21 patient had BIOPSY at  radiology \par  -- Left Breast medial Biopsy pathology c/w DCIS intermediate to high grade cribriform , solid and focal comedo patterns with extensive lobular involvement  Largest focus 1.6 cm  % % \par  -- Left breast upper posterior biopsy Pathology c/w DCIS cribriform/ SOlid DCIS grade intermediate to high  No necrosis Largest focus 0.9cm Er 100% % \par  -- LEft axilla 1.2 cm Negative for malignancy, C/w a benign process\par  \par  On 2021 s/p Left mastectomy with with sentinal LN evaluation 3 SLN, \par  - T1B IDC Gr 3, 1cm in greatest dimension, 1 cm from margin, \par  - DCIS Int tohigh nuclear grade No necrosis \par  1cm From closest negative margin \par  - LCIS\par  LN: 4 benign LN, 1 LN benign with giant cell reaction \par  \par  GYN HX: Menarche 13yo, Menstruating, Regular periods,  M1 \par  13yo 10yo, 7 yo  [de-identified] : : #[id number]  Patient here for follow up  She started Tamoxifen on 5/12/21 reports compliance, does not miss any doses On Lupron q  monthly. Received last dose on 12/15/23, continue Q month Stopped  taking vitamin D Has not had a period since Jun 2021   She reports feeling well Compliant with Tamoxifen, has not missed dose Denies hot flashes, joint pain, night sweats, fatigue Still has not had period since started Lupron Next Mammogram for June 2024, felt ball on breast last mammo/sono, was a cyst June 2024 next GYN visit, removed 2 polyps at last GYN visit November, July 2024 getting US  ZP in [place] and had a transvaginal US on 10/24/22- 0.6 cm thick endometrial lining Mammo/Breast Us on  6/13/23 Bi-Rads1   [facility]  Breast surgery:  Dr Coronado    ONCOTYPE: 13, Distant risk of recurrence at 9y , with paniagua at 4%  Avg chemotherapy benefit at <1%

## 2024-04-26 ENCOUNTER — OUTPATIENT (OUTPATIENT)
Dept: OUTPATIENT SERVICES | Facility: HOSPITAL | Age: 49
LOS: 1 days | Discharge: HOME | End: 2024-04-26

## 2024-04-26 DIAGNOSIS — C50.919 MALIGNANT NEOPLASM OF UNSPECIFIED SITE OF UNSPECIFIED FEMALE BREAST: ICD-10-CM

## 2024-04-26 DIAGNOSIS — Z98.890 OTHER SPECIFIED POSTPROCEDURAL STATES: Chronic | ICD-10-CM

## 2024-04-26 DIAGNOSIS — Z90.12 ACQUIRED ABSENCE OF LEFT BREAST AND NIPPLE: Chronic | ICD-10-CM

## 2024-05-06 ENCOUNTER — APPOINTMENT (OUTPATIENT)
Age: 49
End: 2024-05-06

## 2024-06-03 ENCOUNTER — APPOINTMENT (OUTPATIENT)
Age: 49
End: 2024-06-03

## 2024-07-01 ENCOUNTER — APPOINTMENT (OUTPATIENT)
Dept: HEMATOLOGY ONCOLOGY | Facility: CLINIC | Age: 49
End: 2024-07-01

## 2024-07-01 ENCOUNTER — RESULT REVIEW (OUTPATIENT)
Age: 49
End: 2024-07-01

## 2024-07-01 ENCOUNTER — APPOINTMENT (OUTPATIENT)
Age: 49
End: 2024-07-01

## 2024-07-01 PROBLEM — C50.912 INVASIVE DUCTAL CARCINOMA OF BREAST, LEFT: Status: ACTIVE | Noted: 2021-03-24

## 2024-07-01 LAB
BASOPHILS # BLD AUTO: 0.1 K/UL — SIGNIFICANT CHANGE UP (ref 0–0.2)
BASOPHILS NFR BLD AUTO: 1.2 % — SIGNIFICANT CHANGE UP (ref 0–2)
EOSINOPHIL # BLD AUTO: 0.2 K/UL — SIGNIFICANT CHANGE UP (ref 0–0.5)
EOSINOPHIL NFR BLD AUTO: 2 % — SIGNIFICANT CHANGE UP (ref 0–6)
HCT VFR BLD CALC: 36.1 % — SIGNIFICANT CHANGE UP (ref 34.5–45)
HGB BLD-MCNC: 12.1 G/DL — SIGNIFICANT CHANGE UP (ref 11.5–15.5)
LYMPHOCYTES # BLD AUTO: 2.8 K/UL — SIGNIFICANT CHANGE UP (ref 1–3.3)
LYMPHOCYTES # BLD AUTO: 35.9 % — SIGNIFICANT CHANGE UP (ref 13–44)
MCHC RBC-ENTMCNC: 30.7 PG — SIGNIFICANT CHANGE UP (ref 27–34)
MCHC RBC-ENTMCNC: 33.6 G/DL — SIGNIFICANT CHANGE UP (ref 32–36)
MCV RBC AUTO: 91.4 FL — SIGNIFICANT CHANGE UP (ref 80–100)
MONOCYTES # BLD AUTO: 0.6 K/UL — SIGNIFICANT CHANGE UP (ref 0–0.9)
MONOCYTES NFR BLD AUTO: 7.3 % — SIGNIFICANT CHANGE UP (ref 2–14)
NEUTROPHILS # BLD AUTO: 4.1 K/UL — SIGNIFICANT CHANGE UP (ref 1.8–7.4)
NEUTROPHILS NFR BLD AUTO: 53.5 % — SIGNIFICANT CHANGE UP (ref 43–77)
PLATELET # BLD AUTO: 301 K/UL — SIGNIFICANT CHANGE UP (ref 150–400)
RBC # BLD: 3.95 M/UL — SIGNIFICANT CHANGE UP (ref 3.8–5.2)
RBC # FLD: 11.7 % — SIGNIFICANT CHANGE UP (ref 10.3–14.5)
WBC # BLD: 7.7 K/UL — SIGNIFICANT CHANGE UP (ref 3.8–10.5)
WBC # FLD AUTO: 7.7 K/UL — SIGNIFICANT CHANGE UP (ref 3.8–10.5)

## 2024-07-02 LAB
ALBUMIN SERPL ELPH-MCNC: 4.3 G/DL
ALP BLD-CCNC: 75 U/L
ALT SERPL-CCNC: 61 U/L
ANION GAP SERPL CALC-SCNC: 14 MMOL/L
AST SERPL-CCNC: 44 U/L
BILIRUB SERPL-MCNC: 0.2 MG/DL
BUN SERPL-MCNC: 9 MG/DL
CALCIUM SERPL-MCNC: 9.3 MG/DL
CHLORIDE SERPL-SCNC: 102 MMOL/L
CO2 SERPL-SCNC: 24 MMOL/L
CREAT SERPL-MCNC: 0.6 MG/DL
EGFR: 112 ML/MIN/1.73M2
GLUCOSE SERPL-MCNC: 93 MG/DL
POTASSIUM SERPL-SCNC: 4.1 MMOL/L
PROT SERPL-MCNC: 8.1 G/DL
SODIUM SERPL-SCNC: 141 MMOL/L

## 2024-07-19 ENCOUNTER — OUTPATIENT (OUTPATIENT)
Dept: OUTPATIENT SERVICES | Facility: HOSPITAL | Age: 49
LOS: 1 days | Discharge: HOME | End: 2024-07-19

## 2024-07-19 DIAGNOSIS — Z90.12 ACQUIRED ABSENCE OF LEFT BREAST AND NIPPLE: Chronic | ICD-10-CM

## 2024-07-19 DIAGNOSIS — C50.919 MALIGNANT NEOPLASM OF UNSPECIFIED SITE OF UNSPECIFIED FEMALE BREAST: ICD-10-CM

## 2024-07-19 DIAGNOSIS — Z98.890 OTHER SPECIFIED POSTPROCEDURAL STATES: Chronic | ICD-10-CM

## 2024-07-22 ENCOUNTER — APPOINTMENT (OUTPATIENT)
Dept: HEMATOLOGY ONCOLOGY | Facility: CLINIC | Age: 49
End: 2024-07-22
Payer: SELF-PAY

## 2024-07-22 VITALS
DIASTOLIC BLOOD PRESSURE: 90 MMHG | WEIGHT: 171.63 LBS | OXYGEN SATURATION: 97 % | SYSTOLIC BLOOD PRESSURE: 144 MMHG | HEART RATE: 74 BPM | BODY MASS INDEX: 30.41 KG/M2 | TEMPERATURE: 98 F | HEIGHT: 63 IN

## 2024-07-22 DIAGNOSIS — C50.912 MALIGNANT NEOPLASM OF UNSPECIFIED SITE OF LEFT FEMALE BREAST: ICD-10-CM

## 2024-07-22 PROCEDURE — 99214 OFFICE O/P EST MOD 30 MIN: CPT

## 2024-07-23 NOTE — ASSESSMENT
[FreeTextEntry1] : 45 yo female with past medical history of HTN with a diagnosis of T1b N0 IDC Gr 3, with DCIS and LCIS s/p mastectomy in Feb 2021 at the age of 45  Screening Mammogram done in nov 2020, with benign findings Patient subsequently, had pain so had a biopsy on 11/25/21 PATHOLOGY with Left Breast 1.00 5cm FN Moderately differentiated IDC measuring 0.8cm, with DCIS intermediate grade with solid and cribriform pattern, focal necrosis and calcifications and extending into lobules  Repeat BIopsy on 1/8/21 on 2 separate areas of left breast with DCIS intermediate to high grade cribriform , % % In addition Left axilla 1.2 cm Negative for malignancy, C/w a benign process  On 2/5/2021 s/p Left mastectomy with with sentinal LN evaluation T1B IDC Gr 3, 1cm in greatest dimension, 1 cm from margin, DCIS Int to high nuclear grade No necrosis 1cm From closest negative margin, LCIS LN: 4 benign LN, 1 LN benign with giant cell reaction  - Breast surgeon Keon Coronado - ONCOTYPE: 13, Distant risk of recurrence at 9y , with paniagua at 4% Avg chemotherapy benefit at <1% given that she is 45 discussed chemotherapy with TC with her as well. PAtient really wants to forgo chemotherapy. - Discussed with Dr Coronado, and plan for surveillance with Mammo/ MR Breast q 6 months: Mammogram in 11/2021: Birads 3 with post surgical scarring with foci of fat necrosis along the inferior aspect of the reconstructed breast along the scar. S/p left mastectomy with reconstruction  11/11/22 CBC WNL, Vitamin D WNL, CMP WNL  - LMP June 2021: Paniagua + OFS -Continue taking Vit D - Tamoxifen 20 mg daily, discussed AE Of VTE/ Uterine carcinoma/ Hot flashes/ Lab abnormalities - She knows to call if s/s of VTE - Continue lupron q monthly - Right breast: 6/10/22: BI-RADS: No suspicious mammographic findings noted. Next mammo due 6/2023. - Did not see genetic counselor at [facility] Sent referral to [facility] however she did not make appointment. -Advised to f/u with GYN q 6 months and transvaginal US yearly - 10/24/22 Transvaginal US from [facility]- 0.6 cm thick endometrial lining, has f/u on 6/8 with Gyn - Continue f/u with Gyn and Dr. Coronado - NOT had a period since Jun 2021   -Mammo/Breast Us on 6/13/23 Bi-rads 1 -Pelvic US on 7/3/23 0.6 cm tick endometrial lining with 0.3 cm endometrial polyp  - Repeat Mammo/US in November 2023, patient felt lump. As per patient, demonstrated a fatty cyst -s/p Uterine Polyps removal in November, benign s per patient. will obtain pathology  - Had Mammogram on June 2024, bi-rads 2 - had breast cyst removed in June with Dr. Coronado, pathology - benign. Will obtain records  - Next mammo/sono June 2025 - Has f/u with Gynecology tomorrow   F/u in 4 months

## 2024-07-23 NOTE — ASSESSMENT
[FreeTextEntry1] : 45 yo female with past medical history of HTN with a diagnosis of T1b N0 IDC Gr 3, with DCIS and LCIS s/p mastectomy in Feb 2021 at the age of 45  Screening Mammogram done in nov 2020, with benign findings Patient subsequently, had pain so had a biopsy on 11/25/21 PATHOLOGY with Left Breast 1.00 5cm FN Moderately differentiated IDC measuring 0.8cm, with DCIS intermediate grade with solid and cribriform pattern, focal necrosis and calcifications and extending into lobules  Repeat BIopsy on 1/8/21 on 2 separate areas of left breast with DCIS intermediate to high grade cribriform , % % In addition Left axilla 1.2 cm Negative for malignancy, C/w a benign process  On 2/5/2021 s/p Left mastectomy with with sentinal LN evaluation T1B IDC Gr 3, 1cm in greatest dimension, 1 cm from margin, DCIS Int to high nuclear grade No necrosis 1cm From closest negative margin, LCIS LN: 4 benign LN, 1 LN benign with giant cell reaction  - Breast surgeon Keon Coronado - ONCOTYPE: 13, Distant risk of recurrence at 9y , with paniagua at 4% Avg chemotherapy benefit at <1% given that she is 45 discussed chemotherapy with TC with her as well. PAtient really wants to forgo chemotherapy. - Discussed with Dr Coronado, and plan for surveillance with Mammo/ MR Breast q 6 months: Mammogram in 11/2021: Birads 3 with post surgical scarring with foci of fat necrosis along the inferior aspect of the reconstructed breast along the scar. S/p left mastectomy with reconstruction  11/11/22 CBC WNL, Vitamin D WNL, CMP WNL  - LMP June 2021: Paniagua + OFS -Continue taking Vit D - Tamoxifen 20 mg daily, discussed AE Of VTE/ Uterine carcinoma/ Hot flashes/ Lab abnormalities - She knows to call if s/s of VTE - Continue lupron q monthly - Right breast: 6/10/22: BI-RADS: No suspicious mammographic findings noted. Next mammo due 6/2023. - Did not see genetic counselor at [facility] Sent referral to [facility] however she did not make appointment. -Advised to f/u with GYN q 6 months and transvaginal US yearly - 10/24/22 Transvaginal US from [facility]- 0.6 cm thick endometrial lining, has f/u on 6/8 with Gyn - Continue f/u with Gyn and Dr. Coronado - NOT had a period since Jun 2021   -Mammo/Breast Us on 6/13/23 Bi-rads 1 -Pelvic US on 7/3/23 0.6 cm tick endometrial lining with 0.3 cm endometrial polyp  - Repeat Mammo/US in November 2023, patient felt lump. As per patient, demonstrated a fatty cyst -s/p Uterine Polyps removal in November, benign s per patient. will obtain pathology  - Had Mammogram on June 2024, bi-rads 2 - had breast cyst removed in June with Dr. Coronado, pathology - benign. Will obtain records  - Next mammo/sono June 2025 - Has f/u with Gynecology tomorrow   F/u in 4 months

## 2024-07-23 NOTE — HISTORY OF PRESENT ILLNESS
[de-identified] : 44 yo female with  past medical history of HTN on HCTZ\par  Patient was having routine screening mammograms from the age of 42\par  \par  On 11/3/2020: screening Mammogram with Focal asymmetry in the superior aspect of right breast Mild increase prominance of axillary nodes, impression Bengn, f/u in 1 year \par  Patient continued to have pain in breast and had a biopsy \par  \par  On 20: BIOPSY AT [facility] PATH[facility] \par  Left Breast 1.00 5cm FN Moderately differentiated IDC measuring 0.8cm, with DCIS intermediate grade with solid and cribriform pattern, focal necrosis and calcifications and extending into lobules\par  \par  MRI BREAST 2020: \par  LEft breast: Biopsy proven left outer breast moderately differentiated IDC and DCIS extensive surrounding enhancement throughout  the entire left breast most notable within the upper quadrant and lower outer quadrant. Enhancement is diffuse extensive and difficult to dilineate. Overall clumped contigous enhancement measures upto 14 cm from anterior to posterior  Concerning for diffuse involvement of left breast Abnormal appearance of left axillary LN concerning for metastases \par  - Prominant slightly abnormal rt axilary LNs  for which Rt breast SONO is recommended \par  \par  On 21 patient had BIOPSY at  radiology \par  -- Left Breast medial Biopsy pathology c/w DCIS intermediate to high grade cribriform , solid and focal comedo patterns with extensive lobular involvement  Largest focus 1.6 cm  % % \par  -- Left breast upper posterior biopsy Pathology c/w DCIS cribriform/ SOlid DCIS grade intermediate to high  No necrosis Largest focus 0.9cm Er 100% % \par  -- LEft axilla 1.2 cm Negative for malignancy, C/w a benign process\par  \par  On 2021 s/p Left mastectomy with with sentinal LN evaluation 3 SLN, \par  - T1B IDC Gr 3, 1cm in greatest dimension, 1 cm from margin, \par  - DCIS Int tohigh nuclear grade No necrosis \par  1cm From closest negative margin \par  - LCIS\par  LN: 4 benign LN, 1 LN benign with giant cell reaction \par  \par  GYN HX: Menarche 13yo, Menstruating, Regular periods,  M1 \par  13yo 10yo, 5 yo  [de-identified] : : #[id number]  Patient here for follow up  She started Tamoxifen on 5/12/21 reports compliance, does not miss any doses On Lupron q  monthly. Received last dose on 7/1/24, continue Q month Stopped  taking vitamin D Has not had a period since Jun 2021   She reports feeling well Denies hot flashes, joint pain, night sweats, fatigue Still has not had period since started Lupron  Had Mammogram on June 2024, bi-rads 2 States she had breast cyst removed in June with Dr. Coronado, pathology - benign. Will obtain records  GYN visit scheduled tomorrow , removed 2 polyps at last GYN visit November, July 2024 getting US  ZP in [place] and had a transvaginal US on 10/24/22- 0.6 cm thick endometrial lining Mammo/Breast Us on  6/13/23 Bi-Rads1   OBGYN- Dr. Blanca Castelan [facility]  Breast surgery:  Dr Coronado    ONCOTYPE: 13, Distant risk of recurrence at 9y , with paniagua at 4%  Avg chemotherapy benefit at <1%

## 2024-07-23 NOTE — HISTORY OF PRESENT ILLNESS
[de-identified] : 44 yo female with  past medical history of HTN on HCTZ\par  Patient was having routine screening mammograms from the age of 42\par  \par  On 11/3/2020: screening Mammogram with Focal asymmetry in the superior aspect of right breast Mild increase prominance of axillary nodes, impression Bengn, f/u in 1 year \par  Patient continued to have pain in breast and had a biopsy \par  \par  On 20: BIOPSY AT [facility] PATHOLOPGY \par  Left Breast 1.00 5cm FN Moderately differentiated IDC measuring 0.8cm, with DCIS intermediate grade with solid and cribriform pattern, focal necrosis and calcifications and extending into lobules\par  \par  MRI BREAST 2020: \par  LEft breast: Biopsy proven left outer breast moderately differentiated IDC and DCIS extensive surrounding enhancement throughout  the entire left breast most notable within the upper quadrant and lower outer quadrant. Enhancement is diffuse extensive and difficult to dilineate. Overall clumped contigous enhancement measures upto 14 cm from anterior to posterior  Concerning for diffuse involvement of left breast Abnormal appearance of left axillary LN concerning for metastases \par  - Prominant slightly abnormal rt axilary LNs  for which Rt breast SONO is recommended \par  \par  On 21 patient had BIOPSY at  radiology \par  -- Left Breast medial Biopsy pathology c/w DCIS intermediate to high grade cribriform , solid and focal comedo patterns with extensive lobular involvement  Largest focus 1.6 cm  % % \par  -- Left breast upper posterior biopsy Pathology c/w DCIS cribriform/ SOlid DCIS grade intermediate to high  No necrosis Largest focus 0.9cm Er 100% % \par  -- LEft axilla 1.2 cm Negative for malignancy, C/w a benign process\par  \par  On 2021 s/p Left mastectomy with with sentinal LN evaluation 3 SLN, \par  - T1B IDC Gr 3, 1cm in greatest dimension, 1 cm from margin, \par  - DCIS Int tohigh nuclear grade No necrosis \par  1cm From closest negative margin \par  - LCIS\par  LN: 4 benign LN, 1 LN benign with giant cell reaction \par  \par  GYN HX: Menarche 15yo, Menstruating, Regular periods,  M1 \par  15yo 12yo, 5 yo  [de-identified] : : #[id number]  Patient here for follow up  She started Tamoxifen on 5/12/21 reports compliance, does not miss any doses On Lupron q  monthly. Received last dose on 7/1/24, continue Q month Stopped  taking vitamin D Has not had a period since Jun 2021   She reports feeling well Denies hot flashes, joint pain, night sweats, fatigue Still has not had period since started Lupron  Had Mammogram on June 2024, bi-rads 2 States she had breast cyst removed in June with Dr. Coronado, pathology - benign. Will obtain records  GYN visit scheduled tomorrow , removed 2 polyps at last GYN visit November, July 2024 getting US  ZP in [place] and had a transvaginal US on 10/24/22- 0.6 cm thick endometrial lining Mammo/Breast Us on  6/13/23 Bi-Rads1   OBGYN- Dr. Blanca Castelan [facility]  Breast surgery:  Dr Coronado    ONCOTYPE: 13, Distant risk of recurrence at 9y , with paniagua at 4%  Avg chemotherapy benefit at <1%

## 2024-07-29 ENCOUNTER — APPOINTMENT (OUTPATIENT)
Age: 49
End: 2024-07-29

## 2024-08-26 ENCOUNTER — APPOINTMENT (OUTPATIENT)
Age: 49
End: 2024-08-26

## 2024-09-23 ENCOUNTER — APPOINTMENT (OUTPATIENT)
Age: 49
End: 2024-09-23

## 2024-10-21 ENCOUNTER — APPOINTMENT (OUTPATIENT)
Age: 49
End: 2024-10-21

## 2024-11-18 ENCOUNTER — RESULT REVIEW (OUTPATIENT)
Age: 49
End: 2024-11-18

## 2024-11-18 ENCOUNTER — APPOINTMENT (OUTPATIENT)
Age: 49
End: 2024-11-18

## 2024-11-18 ENCOUNTER — APPOINTMENT (OUTPATIENT)
Dept: HEMATOLOGY ONCOLOGY | Facility: CLINIC | Age: 49
End: 2024-11-18
Payer: COMMERCIAL

## 2024-11-18 VITALS
WEIGHT: 170.97 LBS | HEART RATE: 85 BPM | HEIGHT: 63 IN | OXYGEN SATURATION: 100 % | SYSTOLIC BLOOD PRESSURE: 118 MMHG | DIASTOLIC BLOOD PRESSURE: 80 MMHG | BODY MASS INDEX: 30.29 KG/M2

## 2024-11-18 DIAGNOSIS — C50.912 MALIGNANT NEOPLASM OF UNSPECIFIED SITE OF LEFT FEMALE BREAST: ICD-10-CM

## 2024-11-18 PROCEDURE — 99215 OFFICE O/P EST HI 40 MIN: CPT

## (undated) DEVICE — CSC-COLONOSCOPE 2002772: Type: DURABLE MEDICAL EQUIPMENT

## (undated) DEVICE — VALVE ENDO SURESEAL II 0-5FR